# Patient Record
Sex: MALE | Race: WHITE | NOT HISPANIC OR LATINO | Employment: PART TIME | URBAN - METROPOLITAN AREA
[De-identification: names, ages, dates, MRNs, and addresses within clinical notes are randomized per-mention and may not be internally consistent; named-entity substitution may affect disease eponyms.]

---

## 2018-04-06 ENCOUNTER — OFFICE VISIT (OUTPATIENT)
Dept: INTERNAL MEDICINE | Age: 48
End: 2018-04-06

## 2018-04-06 ENCOUNTER — HOSPITAL ENCOUNTER (OUTPATIENT)
Dept: GENERAL RADIOLOGY | Facility: HOSPITAL | Age: 48
Discharge: HOME | End: 2018-04-06
Admitting: NURSE PRACTITIONER

## 2018-04-06 VITALS
SYSTOLIC BLOOD PRESSURE: 132 MMHG | HEART RATE: 89 BPM | HEIGHT: 69 IN | OXYGEN SATURATION: 98 % | DIASTOLIC BLOOD PRESSURE: 88 MMHG | WEIGHT: 233.8 LBS | BODY MASS INDEX: 34.63 KG/M2 | TEMPERATURE: 98.7 F

## 2018-04-06 DIAGNOSIS — R10.13 CHRONIC EPIGASTRIC PAIN: ICD-10-CM

## 2018-04-06 DIAGNOSIS — K22.70 BARRETT'S ESOPHAGUS WITHOUT DYSPLASIA: ICD-10-CM

## 2018-04-06 DIAGNOSIS — Z13.1 SCREENING FOR DIABETES MELLITUS: ICD-10-CM

## 2018-04-06 DIAGNOSIS — Z23 ENCOUNTER FOR IMMUNIZATION: ICD-10-CM

## 2018-04-06 DIAGNOSIS — G89.29 CHRONIC EPIGASTRIC PAIN: ICD-10-CM

## 2018-04-06 DIAGNOSIS — N52.9 ERECTILE DYSFUNCTION, UNSPECIFIED ERECTILE DYSFUNCTION TYPE: ICD-10-CM

## 2018-04-06 DIAGNOSIS — Z00.00 PREVENTATIVE HEALTH CARE: Primary | ICD-10-CM

## 2018-04-06 DIAGNOSIS — R05.8 COUGH PRESENT FOR GREATER THAN 3 WEEKS: ICD-10-CM

## 2018-04-06 DIAGNOSIS — Z13.220 SCREENING FOR LIPID DISORDERS: ICD-10-CM

## 2018-04-06 PROCEDURE — 71046 X-RAY EXAM CHEST 2 VIEWS: CPT

## 2018-04-06 PROCEDURE — 99203 OFFICE O/P NEW LOW 30 MIN: CPT | Performed by: NURSE PRACTITIONER

## 2018-04-06 PROCEDURE — 90471 IMMUNIZATION ADMIN: CPT | Performed by: NURSE PRACTITIONER

## 2018-04-06 PROCEDURE — 90715 TDAP VACCINE 7 YRS/> IM: CPT | Performed by: NURSE PRACTITIONER

## 2018-04-06 RX ORDER — SILDENAFIL 50 MG/1
50 TABLET, FILM COATED ORAL DAILY PRN
Qty: 10 TABLET | Refills: 1 | Status: SHIPPED | OUTPATIENT
Start: 2018-04-06 | End: 2019-11-11

## 2018-04-06 RX ORDER — RANITIDINE 150 MG/1
150 CAPSULE ORAL DAILY
Qty: 90 CAPSULE | Refills: 1 | Status: SHIPPED | OUTPATIENT
Start: 2018-04-06 | End: 2018-10-09 | Stop reason: SDUPTHER

## 2018-04-06 RX ORDER — LEVOCETIRIZINE DIHYDROCHLORIDE 5 MG/1
5 TABLET, FILM COATED ORAL DAILY
Refills: 2 | COMMUNITY
Start: 2018-03-08 | End: 2018-11-20 | Stop reason: SDUPTHER

## 2018-04-06 RX ORDER — DEXLANSOPRAZOLE 60 MG/1
60 CAPSULE, DELAYED RELEASE ORAL
COMMUNITY
End: 2018-05-29 | Stop reason: SDUPTHER

## 2018-04-06 RX ORDER — DEXLANSOPRAZOLE 60 MG/1
1 CAPSULE, DELAYED RELEASE ORAL DAILY
Refills: 0 | COMMUNITY
Start: 2018-02-19 | End: 2018-04-06 | Stop reason: SDUPTHER

## 2018-04-06 RX ORDER — MOMETASONE FUROATE 50 UG/1
SPRAY, METERED NASAL
Refills: 0 | COMMUNITY
Start: 2018-02-09 | End: 2018-05-14 | Stop reason: SDUPTHER

## 2018-04-06 RX ORDER — AMLODIPINE BESYLATE 10 MG/1
TABLET ORAL
COMMUNITY
Start: 2018-02-10 | End: 2018-11-29 | Stop reason: SDUPTHER

## 2018-04-06 NOTE — PROGRESS NOTES
"[facility] [place] [facility]      Ariel Portillo / 47 y.o. / male  04/06/2018    VITALS:    Visit Vitals  /88   Pulse 89   Temp 98.7 °F (37.1 °C)   Ht 175.3 cm (69\")   Wt 106 kg (233 lb 12.8 oz)   SpO2 98%   BMI 34.53 kg/m²       BP Readings from Last 3 Encounters:   04/06/18 132/88     Wt Readings from Last 3 Encounters:   04/06/18 106 kg (233 lb 12.8 oz)      Body mass index is 34.53 kg/m².    CC: Main reason(s) for today's visit: Establish Care; Cough (Cough x 6 mths ); and Erectile Dysfunction (Would like to discuss being put on medication )      HPI:    Patient is a 47 y.o. male who is here to establish care. Previous PCP was Devika Davila (sp?) GLENN. Last visit 1 month ago, has not had labs in over 1.5 years.     Cough: Patient complains of nonproductive cough.  Symptoms began 6 months ago.  The cough is non-productive, without wheezing, dyspnea or hemoptysis and is aggravated by reclining position Associated symptoms include:heartburn. Patient does not have new pets. Patient does not have a history of asthma. Patient does have a history of environmental allergens. Patient has not recent travel. Patient does not have a history of smoking. Patient  does not have previous Chest X-ray.   He has history of Espinal's esophagus, last endoscopy/colonoscopy was at least 5 years ago. He has been on dexilant since then, takes Zantac as needed. Still has persistent epigastric discomfort at times.   He was started on allergy medication recently in the past 1.5 months r/t cough but has not yet noted much improvement.     Reports issues with fatigue, some weight gain (although has not been regularly exercising), and issues with maintaining an erection as well as some decreased libido. He reports having had testosterone level checked in the past and was told it was \"mildly low\".     Patient Care Team:  GLENN Carlos as PCP - General (Internal " Medicine)  ____________________________________________________________________    ASSESSMENT & PLAN:    1. Preventative health care    - CBC & Differential  - Comprehensive Metabolic Panel  - Hemoglobin A1c  - Lipid Panel With / Chol / HDL Ratio  - TSH Rfx On Abnormal To Free T4    2. Espinal's esophagus without dysplasia    - ranitidine (ZANTAC) 150 MG capsule; Take 1 capsule by mouth Daily.  Dispense: 90 capsule; Refill: 1  - Ambulatory Referral to Gastroenterology    3. Cough present for greater than 3 weeks  Cough appears to be most likely related to GERD, but will obtain CXR today to r/o pulmonary abnormality due to chronicity of the cough.     - XR Chest PA & Lateral    4. Screening for diabetes mellitus    - Comprehensive Metabolic Panel  - Hemoglobin A1c    5. Screening for lipid disorders    - Lipid Panel With / Chol / HDL Ratio    6. Encounter for immunization    - Tdap Vaccine Greater Than or Equal To 8yo IM    7. Chronic epigastric pain    - ranitidine (ZANTAC) 150 MG capsule; Take 1 capsule by mouth Daily.  Dispense: 90 capsule; Refill: 1  - Ambulatory Referral to Gastroenterology    8. Erectile dysfunction, unspecified erectile dysfunction type  Will check labs today for thyroid dysfunction, diabetes screening. Discussed with patient we will also do testosterone testing, although he will need to return to office first thing AM to have this done, which he agrees to.       Return in about 2 months (around 6/6/2018) for Next scheduled follow up in office, schedule AM lab 1-2 weeks at convenience (between 8-9am) .    Future Appointments  Date Time Provider Department Center   6/8/2018 2:00 PM GLENN Carlos [facility] None       ____________________________________________________________________        REVIEW OF SYSTEMS    Review of Systems   Constitutional: Positive for fatigue. Negative for chills, fever and unexpected weight change.   Respiratory: Positive for cough (worse with cold weather)  and shortness of breath.    Cardiovascular: Negative for chest pain and leg swelling.   Gastrointestinal: Positive for abdominal pain (constant epigastric pressure). Negative for constipation, diarrhea, nausea and vomiting.   Endocrine: Negative for cold intolerance, heat intolerance, polydipsia, polyphagia and polyuria.   Genitourinary: Negative for difficulty urinating, discharge, dysuria, frequency, penile pain and testicular pain.   Musculoskeletal: Negative for arthralgias and myalgias.   Allergic/Immunologic: Positive for environmental allergies.         PHYSICAL EXAMINATION    Physical Exam   Constitutional: He is oriented to person, place, and time. Vital signs are normal. He appears well-developed and well-nourished. He is cooperative. He does not appear ill. No distress.   Cardiovascular: Normal rate, regular rhythm, S1 normal, S2 normal and normal heart sounds.    No murmur heard.  Pulmonary/Chest: Effort normal and breath sounds normal. He has no decreased breath sounds. He has no wheezes. He has no rhonchi. He has no rales.   Abdominal: Normal appearance and bowel sounds are normal. There is tenderness in the epigastric area.   Neurological: He is alert and oriented to person, place, and time.   Skin: Skin is warm, dry and intact.   Psychiatric: He has a normal mood and affect. His speech is normal and behavior is normal. Judgment and thought content normal. Cognition and memory are normal.   Nursing note and vitals reviewed.      REVIEWED DATA:    Labs:   No results found for: NA, K, AST, ALT, BUN, CREATININE, EGFRIFNONA, EGFRIFAFRI    No results found for: GLUCOSE, HGBA1C, MICROALBUR    No results found for: LDL, HDL, TRIG, CHOLHDLRATIO    No results found for: TSH, FREET4     No results found for: WBC, HGB, PLT      Imaging:        Medical Tests:        Summary of old records / correspondence / consultant report:        Request outside records:         ______________________________________________________________________    ALLERGIES  No Known Allergies     MEDICATIONS  Current Outpatient Prescriptions   Medication Sig Dispense Refill   • amLODIPine (NORVASC) 10 MG tablet      • dexlansoprazole (DEXILANT) 60 MG capsule Take 60 mg by mouth.     • levocetirizine (XYZAL) 5 MG tablet Take 5 mg by mouth Daily.  2   • mometasone (NASONEX) 50 MCG/ACT nasal spray USE1 SPRAY EACH NOSTRIL TWICE A DAY  0   • ranitidine (ZANTAC) 150 MG capsule Take 1 capsule by mouth Daily. 90 capsule 1   • sildenafil (VIAGRA) 50 MG tablet Take 1 tablet by mouth Daily As Needed for erectile dysfunction. 10 tablet 1     No current facility-administered medications for this visit.        PFSH:     The following portions of the patient's history were reviewed and updated as appropriate: Allergies / Current Medications / Past Medical History / Surgical History / Social History / Family History    PROBLEM LIST   There is no problem list on file for this patient.      PAST MEDICAL HISTORY  Past Medical History:   Diagnosis Date   • Allergic    • Espinal esophagus    • Hypertension        SURGICAL HISTORY  Past Surgical History:   Procedure Laterality Date   • COLONOSCOPY     • ENDOSCOPY AND COLONOSCOPY         SOCIAL HISTORY  Social History     Social History   • Marital status: Single     Social History Main Topics   • Smoking status: Never Smoker   • Alcohol use 1.2 oz/week     2 Cans of beer per week      Comment: Occasional    • Drug use: No   • Sexual activity: Yes     Partners: Female     Birth control/ protection: None     Other Topics Concern   • Not on file       FAMILY HISTORY  Family History   Problem Relation Age of Onset   • Heart disease Mother    • Heart disease Father    • Other Sister    • Thyroid disease Sister          **Dragon Disclaimer:   Much of this encounter note is an electronic transcription/translation of spoken language to printed text. The electronic  translation of spoken language may permit erroneous, or at times, nonsensical words or phrases to be inadvertently transcribed. Although I have reviewed the note for such errors, some may still exist.

## 2018-04-07 LAB
ALBUMIN SERPL-MCNC: 4.6 G/DL (ref 3.5–5.2)
ALBUMIN/GLOB SERPL: 1.6 G/DL
ALP SERPL-CCNC: 66 U/L (ref 39–117)
ALT SERPL-CCNC: 35 U/L (ref 1–41)
AST SERPL-CCNC: 25 U/L (ref 1–40)
BASOPHILS # BLD AUTO: 0.09 10*3/MM3 (ref 0–0.2)
BASOPHILS NFR BLD AUTO: 1.8 % (ref 0–1.5)
BILIRUB SERPL-MCNC: 1.2 MG/DL (ref 0.1–1.2)
BUN SERPL-MCNC: 15 MG/DL (ref 6–20)
BUN/CREAT SERPL: 12.4 (ref 7–25)
CALCIUM SERPL-MCNC: 9.8 MG/DL (ref 8.6–10.5)
CHLORIDE SERPL-SCNC: 99 MMOL/L (ref 98–107)
CHOLEST SERPL-MCNC: 227 MG/DL (ref 0–200)
CHOLEST/HDLC SERPL: 3.98 {RATIO}
CO2 SERPL-SCNC: 29.5 MMOL/L (ref 22–29)
CREAT SERPL-MCNC: 1.21 MG/DL (ref 0.76–1.27)
EOSINOPHIL # BLD AUTO: 0.24 10*3/MM3 (ref 0–0.7)
EOSINOPHIL NFR BLD AUTO: 4.7 % (ref 0.3–6.2)
ERYTHROCYTE [DISTWIDTH] IN BLOOD BY AUTOMATED COUNT: 14 % (ref 11.5–14.5)
GFR SERPLBLD CREATININE-BSD FMLA CKD-EPI: 64 ML/MIN/1.73
GFR SERPLBLD CREATININE-BSD FMLA CKD-EPI: 77 ML/MIN/1.73
GLOBULIN SER CALC-MCNC: 2.8 GM/DL
GLUCOSE SERPL-MCNC: 82 MG/DL (ref 65–99)
HBA1C MFR BLD: 5.03 % (ref 4.8–5.6)
HCT VFR BLD AUTO: 46.1 % (ref 40.4–52.2)
HDLC SERPL-MCNC: 57 MG/DL (ref 40–60)
HGB BLD-MCNC: 16 G/DL (ref 13.7–17.6)
IMM GRANULOCYTES # BLD: 0 10*3/MM3 (ref 0–0.03)
IMM GRANULOCYTES NFR BLD: 0 % (ref 0–0.5)
LDLC SERPL CALC-MCNC: 151 MG/DL (ref 0–100)
LYMPHOCYTES # BLD AUTO: 1.31 10*3/MM3 (ref 0.9–4.8)
LYMPHOCYTES NFR BLD AUTO: 25.5 % (ref 19.6–45.3)
MCH RBC QN AUTO: 30.1 PG (ref 27–32.7)
MCHC RBC AUTO-ENTMCNC: 34.7 G/DL (ref 32.6–36.4)
MCV RBC AUTO: 86.8 FL (ref 79.8–96.2)
MONOCYTES # BLD AUTO: 0.35 10*3/MM3 (ref 0.2–1.2)
MONOCYTES NFR BLD AUTO: 6.8 % (ref 5–12)
NEUTROPHILS # BLD AUTO: 3.14 10*3/MM3 (ref 1.9–8.1)
NEUTROPHILS NFR BLD AUTO: 61.2 % (ref 42.7–76)
PLATELET # BLD AUTO: 248 10*3/MM3 (ref 140–500)
POTASSIUM SERPL-SCNC: 4.2 MMOL/L (ref 3.5–5.2)
PROT SERPL-MCNC: 7.4 G/DL (ref 6–8.5)
RBC # BLD AUTO: 5.31 10*6/MM3 (ref 4.6–6)
SODIUM SERPL-SCNC: 141 MMOL/L (ref 136–145)
TRIGL SERPL-MCNC: 93 MG/DL (ref 0–150)
TSH SERPL DL<=0.005 MIU/L-ACNC: 2.63 MIU/ML (ref 0.27–4.2)
VLDLC SERPL CALC-MCNC: 18.6 MG/DL (ref 5–40)
WBC # BLD AUTO: 5.13 10*3/MM3 (ref 4.5–10.7)

## 2018-04-09 ENCOUNTER — TELEPHONE (OUTPATIENT)
Dept: INTERNAL MEDICINE | Age: 48
End: 2018-04-09

## 2018-04-09 RX ORDER — SILDENAFIL CITRATE 20 MG/1
40 TABLET ORAL DAILY PRN
Qty: 20 TABLET | Refills: 0 | Status: SHIPPED | OUTPATIENT
Start: 2018-04-09 | End: 2019-11-11 | Stop reason: SDUPTHER

## 2018-04-09 NOTE — TELEPHONE ENCOUNTER
Pt states Rx for Viagra 50mg Tablet is too expensive.    Qty #10 costs $390.00 at pharmacy.    Pt is requesting new Rx for either Cialis or Levitra.    Please advise.    KD

## 2018-05-09 ENCOUNTER — TELEPHONE (OUTPATIENT)
Dept: GASTROENTEROLOGY | Facility: CLINIC | Age: 48
End: 2018-05-09

## 2018-05-14 DIAGNOSIS — N52.9 ERECTILE DYSFUNCTION, UNSPECIFIED ERECTILE DYSFUNCTION TYPE: ICD-10-CM

## 2018-05-14 DIAGNOSIS — R68.82 DECREASED LIBIDO: Primary | ICD-10-CM

## 2018-05-14 RX ORDER — MOMETASONE FUROATE 50 UG/1
SPRAY, METERED NASAL
Qty: 1 EACH | Refills: 5 | Status: SHIPPED | OUTPATIENT
Start: 2018-05-14 | End: 2018-11-20 | Stop reason: SDUPTHER

## 2018-05-16 LAB
TESTOST FREE SERPL-MCNC: 6.5 PG/ML (ref 6.8–21.5)
TESTOST SERPL-MCNC: 342 NG/DL (ref 264–916)

## 2018-05-29 RX ORDER — DEXLANSOPRAZOLE 60 MG/1
60 CAPSULE, DELAYED RELEASE ORAL DAILY
Qty: 90 CAPSULE | Refills: 1 | Status: SHIPPED | OUTPATIENT
Start: 2018-05-29 | End: 2018-11-20 | Stop reason: SDUPTHER

## 2018-06-08 ENCOUNTER — OFFICE VISIT (OUTPATIENT)
Dept: INTERNAL MEDICINE | Age: 48
End: 2018-06-08

## 2018-06-08 VITALS
DIASTOLIC BLOOD PRESSURE: 86 MMHG | HEART RATE: 83 BPM | OXYGEN SATURATION: 98 % | WEIGHT: 232.6 LBS | HEIGHT: 69 IN | TEMPERATURE: 98.8 F | BODY MASS INDEX: 34.45 KG/M2 | SYSTOLIC BLOOD PRESSURE: 138 MMHG

## 2018-06-08 DIAGNOSIS — R05.3 CHRONIC COUGH: ICD-10-CM

## 2018-06-08 DIAGNOSIS — G89.29 CHRONIC RIGHT EAR PAIN: Primary | ICD-10-CM

## 2018-06-08 DIAGNOSIS — H92.01 CHRONIC RIGHT EAR PAIN: Primary | ICD-10-CM

## 2018-06-08 DIAGNOSIS — H65.191 ACUTE EFFUSION OF RIGHT EAR: ICD-10-CM

## 2018-06-08 PROCEDURE — 99214 OFFICE O/P EST MOD 30 MIN: CPT | Performed by: NURSE PRACTITIONER

## 2018-06-08 NOTE — PROGRESS NOTES
Subjective   Ariel Portillo is a 47 y.o. male.     Cough   This is a chronic problem. Episode onset: > 6 months ago. The problem has been waxing and waning. The cough is non-productive. Associated symptoms include ear pain and shortness of breath. Pertinent negatives include no chest pain, chills, fever, sweats, weight loss or wheezing. Nothing aggravates the symptoms. Treatments tried: Currently on Xyzal daily and Nasonex nasal spray without improvement of symptoms  His past medical history is significant for environmental allergies. There is no history of asthma or COPD.   Earache    There is pain in the right ear. This is a chronic problem. The current episode started more than 1 year ago. The problem has been waxing and waning. There has been no fever. Associated symptoms include coughing. Pertinent negatives include no vomiting. He has tried nothing for the symptoms. There is no history of a chronic ear infection, hearing loss or a tympanostomy tube.      Patient also currently on Dexilant and Zantac daily for treatment of GERD and Espinal's Esophagus. He was referred to GI at last visit for evaluation and likely endoscopy, but has not yet scheduled appointment.     The following portions of the patient's history were reviewed and updated as appropriate: allergies, current medications, past family history, past medical history, past social history, past surgical history and problem list.    Review of Systems   Constitutional: Negative for chills, fever and unexpected weight loss.   HENT: Positive for ear pain.    Respiratory: Positive for cough and shortness of breath. Negative for wheezing.    Cardiovascular: Negative for chest pain.   Gastrointestinal: Positive for GERD (currently controlled). Negative for nausea and vomiting.   Allergic/Immunologic: Positive for environmental allergies.       Objective   Physical Exam   Constitutional: He is oriented to person, place, and time. Vital signs are normal. He  appears well-developed and well-nourished. He is active. He does not appear ill. No distress.   HENT:   Head: Normocephalic and atraumatic.   Right Ear: Hearing, external ear and ear canal normal. Tympanic membrane is retracted. A middle ear effusion (small) is present.   Left Ear: Hearing, tympanic membrane, external ear and ear canal normal.   Nose: Nose normal. Right sinus exhibits no maxillary sinus tenderness and no frontal sinus tenderness. Left sinus exhibits no maxillary sinus tenderness and no frontal sinus tenderness.   Mouth/Throat: Uvula is midline, oropharynx is clear and moist and mucous membranes are normal. No tonsillar exudate.   Cardiovascular: Normal rate, regular rhythm and normal heart sounds.    No murmur heard.  Pulmonary/Chest: Effort normal and breath sounds normal.   Lymphadenopathy:        Head (right side): No submental, no submandibular, no tonsillar, no preauricular, no posterior auricular and no occipital adenopathy present.        Head (left side): No submental, no submandibular, no tonsillar, no preauricular, no posterior auricular and no occipital adenopathy present.     He has no cervical adenopathy.   Neurological: He is alert and oriented to person, place, and time.   Psychiatric: He has a normal mood and affect. His speech is normal and behavior is normal. Thought content normal.   Nursing note and vitals reviewed.        Assessment/Plan   Problems Addressed this Visit     None      Visit Diagnoses     Chronic right ear pain    -  Primary    Relevant Orders    Ambulatory Referral to ENT (Otolaryngology)    Acute effusion of right ear        Relevant Orders    Ambulatory Referral to ENT (Otolaryngology)    Chronic cough        Relevant Orders    Full Pulmonary Function Test With Bronchodilator        1. Chronic right ear pain  Appears to have mild effusion of right ear. Suspect chronic eustachian tube dysfunction, patient is already on NCS and daily antihistamine. Discussed will  refer to ENT for further evaluation and treatmet of likely eustachian tube dysfunction.     - Ambulatory Referral to ENT (Otolaryngology)    2. Acute effusion of right ear    - Ambulatory Referral to ENT (Otolaryngology)    3. Chronic cough  Patient has had intermittent ongoing cough for > 8 months at this point. Not worsened by activity, not worse at nighttime. Recent CXR WNL. Suspect may be either related to allergies or possible GERD, but will obtain PFT testing to r/o any obstructive pulmonary process. Consider adding Singulair for further treatment of allergies pending results of PFTs.   - Full Pulmonary Function Test With Bronchodilator

## 2018-06-08 NOTE — PATIENT INSTRUCTIONS
Eustachian Tube Dysfunction     The eustachian tube connects the middle ear to the back of the nose. It regulates air pressure in the middle ear by allowing air to move between the ear and nose. It also helps to drain fluid from the middle ear space. When the eustachian tube does not function properly, air pressure, fluid, or both can build up in the middle ear.  Eustachian tube dysfunction can affect one or both ears.  What are the causes?  This condition happens when the eustachian tube becomes blocked or cannot open normally. This may result from:  · Ear infections.  · Colds and other upper respiratory infections.  · Allergies.  · Irritation, such as from cigarette smoke or acid from the stomach coming up into the esophagus (gastroesophageal reflux).  · Sudden changes in air pressure, such as from descending in an airplane.  · Abnormal growths in the nose or throat, such as nasal polyps, tumors, or enlarged tissue at the back of the throat (adenoids).  What increases the risk?  This condition may be more likely to develop in people who smoke and people who are overweight. Eustachian tube dysfunction may also be more likely to develop in children, especially children who have:  · Certain birth defects of the mouth, such as cleft palate.  · Large tonsils and adenoids.  What are the signs or symptoms?  Symptoms of this condition may include:  · A feeling of fullness in the ear.  · Ear pain.  · Clicking or popping noises in the ear.  · Ringing in the ear.  · Hearing loss.  · Loss of balance.  Symptoms may get worse when the air pressure around you changes, such as when you travel to an area of high elevation or fly on an airplane.  How is this diagnosed?  This condition may be diagnosed based on:  · Your symptoms.  · A physical exam of your ear, nose, and throat.  · Tests, such as those that measure:  ¨ The movement of your eardrum (tympanogram).  ¨ Your hearing (audiometry).  How is this treated?  Treatment depends  "on the cause and severity of your condition. If your symptoms are mild, you may be able to relieve your symptoms by moving air into (\"popping\") your ears. If you have symptoms of fluid in your ears, treatment may include:  · Decongestants.  · Antihistamines.  · Nasal sprays or ear drops that contain medicines that reduce swelling (steroids).  In some cases, you may need to have a procedure to drain the fluid in your eardrum (myringotomy). In this procedure, a small tube is placed in the eardrum to:  · Drain the fluid.  · Restore the air in the middle ear space.  Follow these instructions at home:  · Take over-the-counter and prescription medicines only as told by your health care provider.  · Use techniques to help pop your ears as recommended by your health care provider. These may include:  ¨ Chewing gum.  ¨ Yawning.  ¨ Frequent, forceful swallowing.  ¨ Closing your mouth, holding your nose closed, and gently blowing as if you are trying to blow air out of your nose.  · Do not do any of the following until your health care provider approves:  ¨ Travel to high altitudes.  ¨ Fly in airplanes.  ¨ Work in a pressurized cabin or room.  ¨ Scuba dive.  · Keep your ears dry. Dry your ears completely after showering or bathing.  · Do not smoke.  · Keep all follow-up visits as told by your health care provider. This is important.  Contact a health care provider if:  · Your symptoms do not go away after treatment.  · Your symptoms come back after treatment.  · You are unable to pop your ears.  · You have:  ¨ A fever.  ¨ Pain in your ear.  ¨ Pain in your head or neck.  ¨ Fluid draining from your ear.  · Your hearing suddenly changes.  · You become very dizzy.  · You lose your balance.  This information is not intended to replace advice given to you by your health care provider. Make sure you discuss any questions you have with your health care provider.  Document Released: 01/13/2017 Document Revised: 05/25/2017 Document " Reviewed: 01/06/2016  [employer] Interactive Patient Education © 2017 [employer]

## 2018-06-15 ENCOUNTER — APPOINTMENT (OUTPATIENT)
Dept: RESPIRATORY THERAPY | Facility: HOSPITAL | Age: 48
End: 2018-06-15

## 2018-06-18 ENCOUNTER — HOSPITAL ENCOUNTER (OUTPATIENT)
Dept: RESPIRATORY THERAPY | Facility: HOSPITAL | Age: 48
Discharge: HOME | End: 2018-06-18
Admitting: NURSE PRACTITIONER

## 2018-06-18 PROCEDURE — 94726 PLETHYSMOGRAPHY LUNG VOLUMES: CPT

## 2018-06-18 PROCEDURE — 94729 DIFFUSING CAPACITY: CPT

## 2018-06-18 PROCEDURE — 94010 BREATHING CAPACITY TEST: CPT

## 2018-06-18 RX ORDER — ALBUTEROL SULFATE 2.5 MG/3ML
2.5 SOLUTION RESPIRATORY (INHALATION) ONCE
Status: DISCONTINUED | OUTPATIENT
Start: 2018-06-18 | End: 2018-06-19 | Stop reason: HOSPADM

## 2018-10-09 DIAGNOSIS — K22.70 BARRETT'S ESOPHAGUS WITHOUT DYSPLASIA: ICD-10-CM

## 2018-10-09 DIAGNOSIS — G89.29 CHRONIC EPIGASTRIC PAIN: ICD-10-CM

## 2018-10-09 DIAGNOSIS — R10.13 CHRONIC EPIGASTRIC PAIN: ICD-10-CM

## 2018-10-09 RX ORDER — RANITIDINE 150 MG/1
150 CAPSULE ORAL DAILY
Qty: 90 CAPSULE | Refills: 1 | Status: SHIPPED | OUTPATIENT
Start: 2018-10-09 | End: 2019-04-19

## 2018-11-20 RX ORDER — LEVOCETIRIZINE DIHYDROCHLORIDE 5 MG/1
5 TABLET, FILM COATED ORAL DAILY
Qty: 90 TABLET | Refills: 2 | Status: SHIPPED | OUTPATIENT
Start: 2018-11-20 | End: 2020-01-29

## 2018-11-20 RX ORDER — MOMETASONE FUROATE 50 UG/1
SPRAY, METERED NASAL
Qty: 1 EACH | Refills: 5 | Status: SHIPPED | OUTPATIENT
Start: 2018-11-20 | End: 2020-07-13 | Stop reason: SDUPTHER

## 2018-11-20 RX ORDER — DEXLANSOPRAZOLE 60 MG/1
60 CAPSULE, DELAYED RELEASE ORAL DAILY
Qty: 90 CAPSULE | Refills: 2 | Status: SHIPPED | OUTPATIENT
Start: 2018-11-20 | End: 2019-04-19 | Stop reason: SDUPTHER

## 2018-11-29 RX ORDER — AMLODIPINE BESYLATE 10 MG/1
10 TABLET ORAL DAILY
Qty: 90 TABLET | Refills: 1 | Status: SHIPPED | OUTPATIENT
Start: 2018-11-29 | End: 2019-02-06

## 2019-01-07 ENCOUNTER — OFFICE VISIT (OUTPATIENT)
Dept: INTERNAL MEDICINE | Age: 49
End: 2019-01-07

## 2019-01-07 VITALS
WEIGHT: 224 LBS | HEART RATE: 95 BPM | OXYGEN SATURATION: 98 % | HEIGHT: 69 IN | BODY MASS INDEX: 33.18 KG/M2 | SYSTOLIC BLOOD PRESSURE: 128 MMHG | DIASTOLIC BLOOD PRESSURE: 88 MMHG | TEMPERATURE: 98.1 F

## 2019-01-07 DIAGNOSIS — K22.70 BARRETT'S ESOPHAGUS WITHOUT DYSPLASIA: ICD-10-CM

## 2019-01-07 DIAGNOSIS — H65.491 CHRONIC OTITIS MEDIA OF RIGHT EAR WITH EFFUSION: ICD-10-CM

## 2019-01-07 DIAGNOSIS — Z23 ENCOUNTER FOR IMMUNIZATION: ICD-10-CM

## 2019-01-07 DIAGNOSIS — R05.8 ALLERGIC COUGH: Primary | ICD-10-CM

## 2019-01-07 DIAGNOSIS — I10 ESSENTIAL HYPERTENSION: ICD-10-CM

## 2019-01-07 PROCEDURE — 90471 IMMUNIZATION ADMIN: CPT | Performed by: NURSE PRACTITIONER

## 2019-01-07 PROCEDURE — 99214 OFFICE O/P EST MOD 30 MIN: CPT | Performed by: NURSE PRACTITIONER

## 2019-01-07 PROCEDURE — 90674 CCIIV4 VAC NO PRSV 0.5 ML IM: CPT | Performed by: NURSE PRACTITIONER

## 2019-01-07 RX ORDER — MONTELUKAST SODIUM 10 MG/1
10 TABLET ORAL NIGHTLY
Qty: 30 TABLET | Refills: 5 | Status: SHIPPED | OUTPATIENT
Start: 2019-01-07 | End: 2019-04-19 | Stop reason: SDUPTHER

## 2019-01-07 NOTE — PROGRESS NOTES
Subjective   Ariel Portillo is a 48 y.o. male.     Hypertension   This is a chronic problem. The problem is unchanged. The problem is controlled. Pertinent negatives include no anxiety, blurred vision, chest pain, headaches, malaise/fatigue, neck pain, orthopnea, palpitations, peripheral edema, PND, shortness of breath or sweats. Risk factors for coronary artery disease include male gender, obesity and family history. Past treatments include calcium channel blockers. Current antihypertension treatment includes nothing. Compliance problems: Patient reports ran out of medication about 1 month ago, has not taken since. BP have been 130/80s at home.     Cough   This is a chronic problem. The current episode started more than 1 year ago. The problem has been unchanged. The problem occurs constantly. The cough is non-productive. Pertinent negatives include no chest pain, headaches, shortness of breath or sweats. Nothing aggravates the symptoms. He has tried OTC cough suppressant (Xyzal, NCS. ) for the symptoms. The treatment provided mild relief. His past medical history is significant for environmental allergies.   He had PFTs done after last visit which were WNL.     History of Espinal's esophagus, last endoscopy approximately > 8 years ago. He was referred to GI last April but has not yet followed up.      The following portions of the patient's history were reviewed and updated as appropriate: allergies, current medications, past family history, past medical history, past social history, past surgical history and problem list.    Review of Systems   Constitutional: Negative for malaise/fatigue.   Eyes: Negative for blurred vision.   Respiratory: Positive for cough. Negative for shortness of breath.    Cardiovascular: Negative for chest pain, palpitations, orthopnea and PND.   Musculoskeletal: Negative for neck pain.   Allergic/Immunologic: Positive for environmental allergies.       Objective   Physical Exam    Constitutional: He is oriented to person, place, and time. Vital signs are normal. He appears well-developed and well-nourished. He is active. He does not appear ill. No distress.   HENT:   Head: Normocephalic and atraumatic.   Right Ear: Hearing, external ear and ear canal normal. A middle ear effusion is present.   Left Ear: Hearing, external ear and ear canal normal. A middle ear effusion is present.   Nose: Nose normal. Right sinus exhibits no maxillary sinus tenderness and no frontal sinus tenderness. Left sinus exhibits no maxillary sinus tenderness and no frontal sinus tenderness.   Mouth/Throat: Uvula is midline, oropharynx is clear and moist and mucous membranes are normal. No tonsillar exudate.   Cardiovascular: Normal rate, regular rhythm and normal heart sounds.   No murmur heard.  Pulmonary/Chest: Effort normal and breath sounds normal.   Lymphadenopathy:        Head (right side): No submental, no submandibular, no tonsillar, no preauricular, no posterior auricular and no occipital adenopathy present.        Head (left side): No submental, no submandibular, no tonsillar, no preauricular, no posterior auricular and no occipital adenopathy present.     He has no cervical adenopathy.   Neurological: He is alert and oriented to person, place, and time.   Psychiatric: He has a normal mood and affect. His speech is normal and behavior is normal. Thought content normal.   Nursing note and vitals reviewed.        Assessment/Plan   Problems Addressed this Visit     None      Visit Diagnoses     Allergic cough    -  Primary    Relevant Medications    montelukast (SINGULAIR) 10 MG tablet    Espinal's esophagus without dysplasia        Essential hypertension        Encounter for immunization        Relevant Orders    Flucelvax Quad=>4Years (PFS)    Chronic otitis media of right ear with effusion            1. Allergic cough  Persistent dry cough despite use of nasal corticosteroid spray and antihistamine.   Patient has been on Singulair in the past, thinks it may have caused too much sedation for his work schedule.  He agrees to trial restart of Singulair, may reduce dose to 5 mg if causing too much somnolence to help with cough.    - montelukast (SINGULAIR) 10 MG tablet; Take 1 tablet by mouth Every Night.  Dispense: 30 tablet; Refill: 5    2. Espinal's esophagus without dysplasia  Discussed need to follow-up with Dr. Vásquez as previously referred, needs updated endoscopy.     3. Essential hypertension  Off of medication x 1 month, currently controlled. Continue to monitor at home, notify me if BP consistently > 140/90.     4. Encounter for immunization    - Flucelvax Quad=>4Years (PFS)    5. Chronic otitis media of right ear with effusion  ENT referral previously ordered, recommend following up as previously discussed.

## 2019-04-19 DIAGNOSIS — R05.8 ALLERGIC COUGH: ICD-10-CM

## 2019-04-19 RX ORDER — DEXLANSOPRAZOLE 60 MG/1
60 CAPSULE, DELAYED RELEASE ORAL DAILY
Qty: 90 CAPSULE | Refills: 2 | Status: SHIPPED | OUTPATIENT
Start: 2019-04-19 | End: 2020-05-19

## 2019-04-19 RX ORDER — MONTELUKAST SODIUM 10 MG/1
10 TABLET ORAL NIGHTLY
Qty: 90 TABLET | Refills: 2 | Status: SHIPPED | OUTPATIENT
Start: 2019-04-19 | End: 2020-05-04

## 2019-07-08 ENCOUNTER — OFFICE VISIT (OUTPATIENT)
Dept: INTERNAL MEDICINE | Age: 49
End: 2019-07-08

## 2019-07-08 VITALS
TEMPERATURE: 97.7 F | WEIGHT: 222.2 LBS | BODY MASS INDEX: 32.91 KG/M2 | OXYGEN SATURATION: 98 % | SYSTOLIC BLOOD PRESSURE: 138 MMHG | DIASTOLIC BLOOD PRESSURE: 98 MMHG | HEART RATE: 75 BPM | HEIGHT: 69 IN

## 2019-07-08 DIAGNOSIS — M54.2 CERVICAL PAIN (NECK): ICD-10-CM

## 2019-07-08 DIAGNOSIS — K22.70 BARRETT'S ESOPHAGUS WITHOUT DYSPLASIA: ICD-10-CM

## 2019-07-08 DIAGNOSIS — Z12.5 ENCOUNTER FOR PROSTATE CANCER SCREENING: ICD-10-CM

## 2019-07-08 DIAGNOSIS — Z00.00 PREVENTATIVE HEALTH CARE: ICD-10-CM

## 2019-07-08 DIAGNOSIS — I10 ESSENTIAL HYPERTENSION: Primary | ICD-10-CM

## 2019-07-08 LAB
ALBUMIN SERPL-MCNC: 4.4 G/DL (ref 3.5–5.2)
ALBUMIN/GLOB SERPL: 2.1 G/DL
ALP SERPL-CCNC: 54 U/L (ref 39–117)
ALT SERPL-CCNC: 24 U/L (ref 1–41)
AST SERPL-CCNC: 21 U/L (ref 1–40)
BASOPHILS # BLD AUTO: 0.09 10*3/MM3 (ref 0–0.2)
BASOPHILS NFR BLD AUTO: 1.9 % (ref 0–1.5)
BILIRUB SERPL-MCNC: 0.5 MG/DL (ref 0.2–1.2)
BUN SERPL-MCNC: 16 MG/DL (ref 6–20)
BUN/CREAT SERPL: 15.4 (ref 7–25)
CALCIUM SERPL-MCNC: 8.8 MG/DL (ref 8.6–10.5)
CHLORIDE SERPL-SCNC: 104 MMOL/L (ref 98–107)
CHOLEST SERPL-MCNC: 178 MG/DL (ref 0–200)
CHOLEST/HDLC SERPL: 3.3 {RATIO}
CO2 SERPL-SCNC: 28.3 MMOL/L (ref 22–29)
CREAT SERPL-MCNC: 1.04 MG/DL (ref 0.76–1.27)
EOSINOPHIL # BLD AUTO: 0.23 10*3/MM3 (ref 0–0.4)
EOSINOPHIL NFR BLD AUTO: 4.9 % (ref 0.3–6.2)
ERYTHROCYTE [DISTWIDTH] IN BLOOD BY AUTOMATED COUNT: 13.8 % (ref 12.3–15.4)
GLOBULIN SER CALC-MCNC: 2.1 GM/DL
GLUCOSE SERPL-MCNC: 100 MG/DL (ref 65–99)
HCT VFR BLD AUTO: 48.5 % (ref 37.5–51)
HDLC SERPL-MCNC: 54 MG/DL (ref 40–60)
HGB BLD-MCNC: 16.3 G/DL (ref 13–17.7)
IMM GRANULOCYTES # BLD AUTO: 0 10*3/MM3 (ref 0–0.05)
IMM GRANULOCYTES NFR BLD AUTO: 0 % (ref 0–0.5)
LDLC SERPL CALC-MCNC: 113 MG/DL (ref 0–100)
LYMPHOCYTES # BLD AUTO: 0.93 10*3/MM3 (ref 0.7–3.1)
LYMPHOCYTES NFR BLD AUTO: 19.7 % (ref 19.6–45.3)
MCH RBC QN AUTO: 29.6 PG (ref 26.6–33)
MCHC RBC AUTO-ENTMCNC: 33.6 G/DL (ref 31.5–35.7)
MCV RBC AUTO: 88 FL (ref 79–97)
MONOCYTES # BLD AUTO: 0.3 10*3/MM3 (ref 0.1–0.9)
MONOCYTES NFR BLD AUTO: 6.4 % (ref 5–12)
NEUTROPHILS # BLD AUTO: 3.16 10*3/MM3 (ref 1.7–7)
NEUTROPHILS NFR BLD AUTO: 67.1 % (ref 42.7–76)
NRBC BLD AUTO-RTO: 0 /100 WBC (ref 0–0.2)
PLATELET # BLD AUTO: 205 10*3/MM3 (ref 140–450)
POTASSIUM SERPL-SCNC: 4.6 MMOL/L (ref 3.5–5.2)
PROT SERPL-MCNC: 6.5 G/DL (ref 6–8.5)
PSA SERPL-MCNC: 0.69 NG/ML (ref 0–4)
RBC # BLD AUTO: 5.51 10*6/MM3 (ref 4.14–5.8)
SODIUM SERPL-SCNC: 143 MMOL/L (ref 136–145)
TRIGL SERPL-MCNC: 53 MG/DL (ref 0–150)
TSH SERPL DL<=0.005 MIU/L-ACNC: 3.22 MIU/ML (ref 0.27–4.2)
VLDLC SERPL CALC-MCNC: 10.6 MG/DL
WBC # BLD AUTO: 4.71 10*3/MM3 (ref 3.4–10.8)

## 2019-07-08 PROCEDURE — 99214 OFFICE O/P EST MOD 30 MIN: CPT | Performed by: NURSE PRACTITIONER

## 2019-07-08 RX ORDER — AMLODIPINE BESYLATE 5 MG/1
5 TABLET ORAL DAILY
Qty: 30 TABLET | Refills: 5 | Status: SHIPPED | OUTPATIENT
Start: 2019-07-08 | End: 2019-11-02 | Stop reason: SDUPTHER

## 2019-07-08 NOTE — PROGRESS NOTES
[facility] [place] [facility]  Page Portillo / 48 y.o. / male  07/08/2019      ASSESSMENT & PLAN:    Problem List Items Addressed This Visit        Cardiovascular and Mediastinum    Essential hypertension - Primary    Relevant Medications    amLODIPine (NORVASC) 5 MG tablet    Other Relevant Orders    Comprehensive Metabolic Panel       Digestive    Espinal's esophagus without dysplasia    Relevant Medications    dexlansoprazole (DEXILANT) 60 MG capsule       Nervous and Auditory    Cervical pain (neck)      Other Visit Diagnoses     Preventative health care        Relevant Orders    Comprehensive Metabolic Panel    CBC & Differential    Lipid Panel With / Chol / HDL Ratio    PSA Screen    TSH Rfx On Abnormal To Free T4    Encounter for prostate cancer screening        Relevant Orders    PSA Screen        Orders Placed This Encounter   Procedures   • Comprehensive Metabolic Panel   • Lipid Panel With / Chol / HDL Ratio   • PSA Screen   • TSH Rfx On Abnormal To Free T4   • CBC & Differential     New Medications Ordered This Visit   Medications   • amLODIPine (NORVASC) 5 MG tablet     Sig: Take 1 tablet by mouth Daily.     Dispense:  30 tablet     Refill:  5       Summary/Discussion:    1. Essential hypertension  Blood pressure suboptimally controlled.  Recommend restart amlodipine at 5 mg daily.  Patient will continue to monitor blood pressure at home, notify me via telephone or my chart in 2 weeks of blood pressure readings.  Check CMP today.  Follow-up in office in 4 months for blood pressure recheck, sooner as needed.    - Comprehensive Metabolic Panel  - amLODIPine (NORVASC) 5 MG tablet; Take 1 tablet by mouth Daily.  Dispense: 30 tablet; Refill: 5    2. Preventative health care    - Comprehensive Metabolic Panel  - CBC & Differential  - Lipid Panel With / Chol / HDL Ratio  - PSA Screen  - TSH Rfx On Abnormal To Free T4    3. Espinal's esophagus without dysplasia  Discussed once again with  "patient need to follow-up with gastroenterology for repeat EGD and colonoscopy as it is been approximately 8 years since most recent.  Currently on Dexilant, continue same.    4. Encounter for prostate cancer screening    - PSA Screen    5. Cervical pain (neck)  Patient would like to return to chiropractor at this time for therapy.  Recommended if pain does not improve, can follow-up with me we can discuss referral to orthopedics if he would like.  We will attempt to obtain report of previous x-rays at that time as needed.        Return in about 4 months (around 11/8/2019) for Next scheduled follow up.    ____________________________________________________________________    MEDICATIONS  Current Outpatient Medications   Medication Sig Dispense Refill   • dexlansoprazole (DEXILANT) 60 MG capsule Take 1 capsule by mouth Daily. 90 capsule 2   • mometasone (NASONEX) 50 MCG/ACT nasal spray ONE SPRAY AND EACH NOSTRIL TWICE DAILY 1 each 5   • montelukast (SINGULAIR) 10 MG tablet Take 1 tablet by mouth Every Night. 90 tablet 2   • amLODIPine (NORVASC) 5 MG tablet Take 1 tablet by mouth Daily. 30 tablet 5   • levocetirizine (XYZAL) 5 MG tablet Take 1 tablet by mouth Daily. 90 tablet 2   • sildenafil (REVATIO) 20 MG tablet Take 2 tablets by mouth Daily As Needed (erectile dysfunction). 20 tablet 0   • sildenafil (VIAGRA) 50 MG tablet Take 1 tablet by mouth Daily As Needed for erectile dysfunction. 10 tablet 1     No current facility-administered medications for this visit.           VITALS:    Visit Vitals  /98   Pulse 75   Temp 97.7 °F (36.5 °C)   Ht 175.3 cm (69\")   Wt 101 kg (222 lb 3.2 oz)   SpO2 98%   BMI 32.81 kg/m²       BP Readings from Last 3 Encounters:   07/08/19 138/98   01/07/19 128/88   06/08/18 138/86     Wt Readings from Last 3 Encounters:   07/08/19 101 kg (222 lb 3.2 oz)   01/07/19 102 kg (224 lb)   06/08/18 106 kg (232 lb 9.6 oz)      Body mass index is 32.81 kg/m².    CC:  Main reason(s) for today's " "visit: Hypertension (6 mo f/u)      HPI:     Hypertension: Patient here for follow-up of elevated blood pressure. He is exercising and is adherent to low salt diet.  Blood pressure is not well controlled at home. Cardiac symptoms none. Patient denies chest pain, chest pressure/discomfort, dyspnea, fatigue and lower extremity edema.  Cardiovascular risk factors: hypertension. Use of agents associated with hypertension: none. History of target organ damage: none. Patient was previously on amlodipine 10mg, had stopped it about 6-7 months ago because BP was running normal.     Neck Pain: Ethantent complains of neck pain. Event that precipitate these symptoms: none known. Onset of symptoms a few years ago, gradually worsening since that time. Current symptoms are pain in posterior neck, left shoulder, occasional radiation down left arm. Patient denies weakness in left arm. . Patient has had recurrent self limited episodes of neck pain in the past.  Previous treatments include: chiropractor/manipulation, reports had XR approximately 1.5 years ago which showed some \"neck issues\".      Patient Care Team:  Page Buitrago APRN as PCP - General (Internal Medicine)    ____________________________________________________________________    REVIEW OF SYSTEMS    Review of Systems   Constitutional: Negative for activity change, appetite change and unexpected weight change.   HENT: Negative for tinnitus.    Eyes: Negative for visual disturbance.   Respiratory: Negative for cough, chest tightness and shortness of breath.    Cardiovascular: Negative for chest pain, palpitations and leg swelling.   Musculoskeletal: Positive for neck pain and neck stiffness.   Neurological: Negative for dizziness, light-headedness and headaches.         PHYSICAL EXAMINATION    Physical Exam   Constitutional: He is oriented to person, place, and time. Vital signs are normal. He appears well-developed and well-nourished. He is cooperative. He does not " appear ill. No distress.   Cardiovascular: Normal rate, regular rhythm, S1 normal, S2 normal and normal heart sounds.   No murmur heard.  Pulmonary/Chest: Effort normal and breath sounds normal. He has no decreased breath sounds. He has no wheezes. He has no rhonchi. He has no rales.   Neurological: He is alert and oriented to person, place, and time.   Skin: Skin is warm, dry and intact.   Psychiatric: He has a normal mood and affect. His speech is normal and behavior is normal. Judgment and thought content normal. Cognition and memory are normal.   Nursing note and vitals reviewed.      REVIEWED DATA:    Labs:     Lab Results   Component Value Date     04/06/2018    K 4.2 04/06/2018    AST 25 04/06/2018    ALT 35 04/06/2018    BUN 15 04/06/2018    CREATININE 1.21 04/06/2018    EGFRIFNONA 64 04/06/2018    EGFRIFAFRI 77 04/06/2018       Lab Results   Component Value Date    HGBA1C 5.03 04/06/2018       Lab Results   Component Value Date     (H) 04/06/2018    HDL 57 04/06/2018    TRIG 93 04/06/2018    CHOLHDLRATIO 3.98 04/06/2018       Lab Results   Component Value Date    TSH 2.63 04/06/2018       Lab Results   Component Value Date    WBC 5.13 04/06/2018    HGB 16.0 04/06/2018     04/06/2018       No results found for: PROTEIN, GLUCOSEU, BLOODU, NITRITEU, LEUKOCYTESUR    Imaging:         Medical Tests:         Summary of old records / correspondence / consultant report:         Request outside records:         ALLERGIES  No Known Allergies     PFSH:     The following portions of the patient's history were reviewed and updated as appropriate: Allergies / Current Medications / Past Medical History / Surgical History / Social History / Family History    PROBLEM LIST   Patient Active Problem List   Diagnosis   • Cervical pain (neck)   • Espinal's esophagus without dysplasia   • Essential hypertension       PAST MEDICAL HISTORY  Past Medical History:   Diagnosis Date   • Allergic    • Espinal  esophagus    • GERD (gastroesophageal reflux disease)    • Hypertension        SURGICAL HISTORY  Past Surgical History:   Procedure Laterality Date   • COLONOSCOPY     • ENDOSCOPY AND COLONOSCOPY         SOCIAL HISTORY  Social History     Socioeconomic History   • Marital status: Single     Spouse name: Not on file   • Number of children: Not on file   • Years of education: Not on file   • Highest education level: Not on file   Tobacco Use   • Smoking status: Never Smoker   • Smokeless tobacco: Never Used   Substance and Sexual Activity   • Alcohol use: Yes     Alcohol/week: 1.2 oz     Types: 2 Cans of beer per week     Comment: Occasional    • Drug use: No   • Sexual activity: Yes     Partners: Female     Birth control/protection: None       FAMILY HISTORY  Family History   Problem Relation Age of Onset   • Heart disease Mother    • Heart disease Father    • Other Sister    • Thyroid disease Sister          **Dragon Disclaimer:   Much of this encounter note is an electronic transcription/translation of spoken language to printed text. The electronic translation of spoken language may permit erroneous, or at times, nonsensical words or phrases to be inadvertently transcribed. Although I have reviewed the note for such errors, some may still exist.

## 2019-11-02 DIAGNOSIS — I10 ESSENTIAL HYPERTENSION: ICD-10-CM

## 2019-11-04 RX ORDER — AMLODIPINE BESYLATE 5 MG/1
TABLET ORAL
Qty: 90 TABLET | Refills: 3 | Status: SHIPPED | OUTPATIENT
Start: 2019-11-04 | End: 2019-11-11 | Stop reason: ALTCHOICE

## 2019-11-11 ENCOUNTER — OFFICE VISIT (OUTPATIENT)
Dept: INTERNAL MEDICINE | Age: 49
End: 2019-11-11

## 2019-11-11 VITALS
DIASTOLIC BLOOD PRESSURE: 92 MMHG | WEIGHT: 226.4 LBS | HEIGHT: 69 IN | HEART RATE: 78 BPM | TEMPERATURE: 97.6 F | SYSTOLIC BLOOD PRESSURE: 148 MMHG | BODY MASS INDEX: 33.53 KG/M2 | OXYGEN SATURATION: 99 %

## 2019-11-11 DIAGNOSIS — I10 ESSENTIAL HYPERTENSION: Primary | ICD-10-CM

## 2019-11-11 DIAGNOSIS — Z12.11 COLON CANCER SCREENING: ICD-10-CM

## 2019-11-11 DIAGNOSIS — N52.9 ERECTILE DYSFUNCTION, UNSPECIFIED ERECTILE DYSFUNCTION TYPE: ICD-10-CM

## 2019-11-11 DIAGNOSIS — K22.70 BARRETT'S ESOPHAGUS WITHOUT DYSPLASIA: ICD-10-CM

## 2019-11-11 DIAGNOSIS — Z23 ENCOUNTER FOR IMMUNIZATION: ICD-10-CM

## 2019-11-11 PROBLEM — R73.01 IMPAIRED FASTING GLUCOSE: Status: ACTIVE | Noted: 2019-11-11

## 2019-11-11 PROCEDURE — 99214 OFFICE O/P EST MOD 30 MIN: CPT | Performed by: NURSE PRACTITIONER

## 2019-11-11 PROCEDURE — 90674 CCIIV4 VAC NO PRSV 0.5 ML IM: CPT | Performed by: NURSE PRACTITIONER

## 2019-11-11 PROCEDURE — 90471 IMMUNIZATION ADMIN: CPT | Performed by: NURSE PRACTITIONER

## 2019-11-11 RX ORDER — LISINOPRIL 10 MG/1
10 TABLET ORAL NIGHTLY
Qty: 90 TABLET | Refills: 1 | Status: SHIPPED | OUTPATIENT
Start: 2019-11-11 | End: 2020-03-09 | Stop reason: ALTCHOICE

## 2019-11-11 RX ORDER — SILDENAFIL 50 MG/1
50 TABLET, FILM COATED ORAL DAILY PRN
Qty: 10 TABLET | Refills: 1 | Status: SHIPPED | OUTPATIENT
Start: 2019-11-11 | End: 2020-08-14 | Stop reason: SDUPTHER

## 2019-11-11 NOTE — PROGRESS NOTES
[facility] [place] [facility]  Page Portillo / 48 y.o. / male  11/11/2019      ASSESSMENT & PLAN:    Problem List Items Addressed This Visit        Cardiovascular and Mediastinum    Essential hypertension - Primary    Relevant Medications    lisinopril (PRINIVIL,ZESTRIL) 10 MG tablet       Digestive    Espinal's esophagus without dysplasia    Relevant Medications    dexlansoprazole (DEXILANT) 60 MG capsule    Other Relevant Orders    Ambulatory Referral to Gastroenterology      Other Visit Diagnoses     Erectile dysfunction, unspecified erectile dysfunction type        Relevant Medications    sildenafil (VIAGRA) 50 MG tablet    Encounter for immunization        Relevant Orders    Flucelvax Quad=>4Years (PFS) (Completed)    Colon cancer screening        Relevant Orders    Ambulatory Referral to Gastroenterology        Orders Placed This Encounter   Procedures   • Flucelvax Quad=>4Years (PFS)   • Ambulatory Referral to Gastroenterology     New Medications Ordered This Visit   Medications   • lisinopril (PRINIVIL,ZESTRIL) 10 MG tablet     Sig: Take 1 tablet by mouth Every Night.     Dispense:  90 tablet     Refill:  1   • sildenafil (VIAGRA) 50 MG tablet     Sig: Take 1 tablet by mouth Daily As Needed for Erectile Dysfunction.     Dispense:  10 tablet     Refill:  1       Summary/Discussion:    1. Essential hypertension  Discontinue amlodipine, switch to lisinopril 10 mg daily.  Discussed with patient he needs to start monitoring blood pressure at home, will contact me in 2 weeks with blood pressure readings as I am aware that it is difficult for him to get into the office for follow-up related to working 2 jobs.  Continue lifestyle modifications for high blood pressure, high cholesterol, and increased weight. Decrease/eliminate soda, caffeine, alcohol and overall caloric intake. Reduce carbohydrates and sweets in diet.  Continue to improve dietary habits with lean proteins, fresh vegetables,  "fruits, and nuts. Improve aerobic exercise: walking/biking/swimming daily as tolerated, recommend 30 minutes/day at least 5 days/week.     - lisinopril (PRINIVIL,ZESTRIL) 10 MG tablet; Take 1 tablet by mouth Every Night.  Dispense: 90 tablet; Refill: 1    2. Espinal's esophagus without dysplasia  Will once again place referral to gastroenterology for follow-up EGD/colonoscopy for monitoring Espinal's esophagus.    - Ambulatory Referral to Gastroenterology    3. Erectile dysfunction, unspecified erectile dysfunction type  Patient never picked up initial prescription related to cost.  Discussed with patient that medication is not generic, will send refill to take as needed.    - sildenafil (VIAGRA) 50 MG tablet; Take 1 tablet by mouth Daily As Needed for Erectile Dysfunction.  Dispense: 10 tablet; Refill: 1    4. Encounter for immunization    - Flucelvax Quad=>4Years (PFS)    5. Colon cancer screening    - Ambulatory Referral to Gastroenterology        Return in about 4 months (around 3/11/2020) for Next scheduled follow up.    ____________________________________________________________________    MEDICATIONS  Current Outpatient Medications   Medication Sig Dispense Refill   • dexlansoprazole (DEXILANT) 60 MG capsule Take 1 capsule by mouth Daily. 90 capsule 2   • levocetirizine (XYZAL) 5 MG tablet Take 1 tablet by mouth Daily. 90 tablet 2   • mometasone (NASONEX) 50 MCG/ACT nasal spray ONE SPRAY AND EACH NOSTRIL TWICE DAILY 1 each 5   • montelukast (SINGULAIR) 10 MG tablet Take 1 tablet by mouth Every Night. 90 tablet 2   • lisinopril (PRINIVIL,ZESTRIL) 10 MG tablet Take 1 tablet by mouth Every Night. 90 tablet 1   • sildenafil (VIAGRA) 50 MG tablet Take 1 tablet by mouth Daily As Needed for Erectile Dysfunction. 10 tablet 1     No current facility-administered medications for this visit.           VITALS:    Visit Vitals  /92   Pulse 78   Temp 97.6 °F (36.4 °C)   Ht 175.3 cm (69\")   Wt 103 kg (226 lb 6.4 oz) "   SpO2 99%   BMI 33.43 kg/m²       BP Readings from Last 3 Encounters:   11/11/19 148/92   07/08/19 138/98   01/07/19 128/88     Wt Readings from Last 3 Encounters:   11/11/19 103 kg (226 lb 6.4 oz)   07/08/19 101 kg (222 lb 3.2 oz)   01/07/19 102 kg (224 lb)      Body mass index is 33.43 kg/m².    CC:  Main reason(s) for today's visit: Hypertension (4 mo f/u; doesn't feel like his BP medication is working); Espinal's esophagus without dysplasia; and Cervical pain (neck)      HPI:     Hypertension: Patient here for follow-up of elevated blood pressure. He is exercising and is not adherent to low salt diet.  Blood pressure is not well controlled at home. Cardiac symptoms none. Patient denies chest pain, chest pressure/discomfort, dyspnea, exertional chest pressure/discomfort and lower extremity edema.  Cardiovascular risk factors: hypertension, male gender and obesity (BMI >= 30 kg/m2). Use of agents associated with hypertension: none. History of target organ damage: none. He has been out of medication for a few weeks, reports that he does not feel that it was working while taking as BP was still running 130-140s/80s.     GERD: Patient has history of Espinal's esophagus. He has not been compliant with recommendations to follow up with GI for repeat EGD to this point r/t work schedule (currently working 2 jobs). Symptoms are currently controlled on Dexilant.     Patient Care Team:  Page Buitrago APRN as PCP - General (Internal Medicine)    ____________________________________________________________________    REVIEW OF SYSTEMS    Review of Systems   Constitutional: Negative for activity change, appetite change and unexpected weight change.   HENT: Negative for tinnitus.    Eyes: Negative for visual disturbance.   Respiratory: Negative for cough, chest tightness and shortness of breath.    Cardiovascular: Negative for chest pain, palpitations and leg swelling.   Neurological: Negative for dizziness,  light-headedness and headaches.         PHYSICAL EXAMINATION    Physical Exam   Constitutional: He is oriented to person, place, and time. He appears well-developed and well-nourished. He is cooperative. He does not appear ill. No distress.   Cardiovascular: Normal rate, regular rhythm, S1 normal, S2 normal and normal heart sounds.   No murmur heard.  Pulmonary/Chest: Effort normal and breath sounds normal. He has no decreased breath sounds. He has no wheezes. He has no rhonchi. He has no rales.   Neurological: He is alert and oriented to person, place, and time.   Skin: Skin is warm, dry and intact.   Psychiatric: He has a normal mood and affect. His speech is normal and behavior is normal. Judgment and thought content normal. Cognition and memory are normal.   Nursing note and vitals reviewed.      REVIEWED DATA:    Labs:     Lab Results   Component Value Date     07/08/2019    K 4.6 07/08/2019    AST 21 07/08/2019    ALT 24 07/08/2019    BUN 16 07/08/2019    CREATININE 1.04 07/08/2019    CREATININE 1.21 04/06/2018    EGFRIFNONA 76 07/08/2019    EGFRIFAFRI 92 07/08/2019       Lab Results   Component Value Date    HGBA1C 5.03 04/06/2018       Lab Results   Component Value Date     (H) 07/08/2019     (H) 04/06/2018    HDL 54 07/08/2019    HDL 57 04/06/2018    TRIG 53 07/08/2019    TRIG 93 04/06/2018    CHOLHDLRATIO 3.30 07/08/2019    CHOLHDLRATIO 3.98 04/06/2018       Lab Results   Component Value Date    TSH 3.220 07/08/2019       Lab Results   Component Value Date    WBC 4.71 07/08/2019    HGB 16.3 07/08/2019    HGB 16.0 04/06/2018     07/08/2019       No results found for: PROTEIN, GLUCOSEU, BLOODU, NITRITEU, LEUKOCYTESUR    Imaging:         Medical Tests:         Summary of old records / correspondence / consultant report:         Request outside records:         ALLERGIES  No Known Allergies     PFSH:     The following portions of the patient's history were reviewed and updated as  appropriate: Allergies / Current Medications / Past Medical History / Surgical History / Social History / Family History    PROBLEM LIST   Patient Active Problem List   Diagnosis   • Cervical pain (neck)   • Espinal's esophagus without dysplasia   • Essential hypertension   • Impaired fasting glucose       PAST MEDICAL HISTORY  Past Medical History:   Diagnosis Date   • Allergic    • Espinal esophagus    • GERD (gastroesophageal reflux disease)    • Hypertension        SURGICAL HISTORY  Past Surgical History:   Procedure Laterality Date   • COLONOSCOPY     • ENDOSCOPY AND COLONOSCOPY         SOCIAL HISTORY  Social History     Socioeconomic History   • Marital status: Single     Spouse name: Not on file   • Number of children: Not on file   • Years of education: Not on file   • Highest education level: Not on file   Tobacco Use   • Smoking status: Never Smoker   • Smokeless tobacco: Never Used   Substance and Sexual Activity   • Alcohol use: Yes     Alcohol/week: 1.2 oz     Types: 2 Cans of beer per week     Comment: Occasional    • Drug use: No   • Sexual activity: Yes     Partners: Female     Birth control/protection: None       FAMILY HISTORY  Family History   Problem Relation Age of Onset   • Heart disease Mother    • Heart disease Father    • Other Sister    • Thyroid disease Sister          **Jessica Disclaimer:   Much of this encounter note is an electronic transcription/translation of spoken language to printed text. The electronic translation of spoken language may permit erroneous, or at times, nonsensical words or phrases to be inadvertently transcribed. Although I have reviewed the note for such errors, some may still exist.

## 2019-11-11 NOTE — PATIENT INSTRUCTIONS
CALL WITH BLOOD PRESSURE READINGS IN 2 WEEKS!!!     NOTIFY ME IF BLOOD PRESSURE CONSISTENTLY GREATER THAN 140/90 OR IF TOO LOW (DIZZINESS, LIGHTHEADEDNESS)!

## 2019-11-12 ENCOUNTER — PREP FOR SURGERY (OUTPATIENT)
Dept: OTHER | Facility: HOSPITAL | Age: 49
End: 2019-11-12

## 2019-11-12 DIAGNOSIS — K22.70 BARRETT'S ESOPHAGUS WITHOUT DYSPLASIA: Primary | ICD-10-CM

## 2019-11-12 DIAGNOSIS — Z12.11 ENCOUNTER FOR SCREENING FOR MALIGNANT NEOPLASM OF COLON: ICD-10-CM

## 2019-11-12 RX ORDER — SODIUM CHLORIDE, SODIUM LACTATE, POTASSIUM CHLORIDE, CALCIUM CHLORIDE 600; 310; 30; 20 MG/100ML; MG/100ML; MG/100ML; MG/100ML
30 INJECTION, SOLUTION INTRAVENOUS CONTINUOUS
Status: CANCELLED | OUTPATIENT
Start: 2020-01-17

## 2019-11-14 PROBLEM — Z12.11 ENCOUNTER FOR SCREENING FOR MALIGNANT NEOPLASM OF COLON: Status: ACTIVE | Noted: 2019-11-14

## 2020-01-10 ENCOUNTER — TELEPHONE (OUTPATIENT)
Dept: GASTROENTEROLOGY | Facility: CLINIC | Age: 50
End: 2020-01-10

## 2020-01-17 ENCOUNTER — ANESTHESIA EVENT (OUTPATIENT)
Dept: GASTROENTEROLOGY | Facility: HOSPITAL | Age: 50
End: 2020-01-17

## 2020-01-17 ENCOUNTER — HOSPITAL ENCOUNTER (OUTPATIENT)
Facility: HOSPITAL | Age: 50
Setting detail: HOSPITAL OUTPATIENT SURGERY
Discharge: HOME | End: 2020-01-17
Attending: INTERNAL MEDICINE | Admitting: INTERNAL MEDICINE

## 2020-01-17 ENCOUNTER — ANESTHESIA (OUTPATIENT)
Dept: GASTROENTEROLOGY | Facility: HOSPITAL | Age: 50
End: 2020-01-17

## 2020-01-17 VITALS
BODY MASS INDEX: 32.98 KG/M2 | WEIGHT: 222.7 LBS | RESPIRATION RATE: 16 BRPM | DIASTOLIC BLOOD PRESSURE: 88 MMHG | SYSTOLIC BLOOD PRESSURE: 120 MMHG | TEMPERATURE: 98.5 F | OXYGEN SATURATION: 97 % | HEIGHT: 69 IN | HEART RATE: 80 BPM

## 2020-01-17 DIAGNOSIS — K22.70 BARRETT'S ESOPHAGUS WITHOUT DYSPLASIA: ICD-10-CM

## 2020-01-17 DIAGNOSIS — Z12.11 ENCOUNTER FOR SCREENING FOR MALIGNANT NEOPLASM OF COLON: ICD-10-CM

## 2020-01-17 PROCEDURE — 45378 DIAGNOSTIC COLONOSCOPY: CPT | Performed by: INTERNAL MEDICINE

## 2020-01-17 PROCEDURE — 43239 EGD BIOPSY SINGLE/MULTIPLE: CPT | Performed by: INTERNAL MEDICINE

## 2020-01-17 PROCEDURE — 25010000002 PROPOFOL 10 MG/ML EMULSION: Performed by: NURSE ANESTHETIST, CERTIFIED REGISTERED

## 2020-01-17 PROCEDURE — 88305 TISSUE EXAM BY PATHOLOGIST: CPT | Performed by: INTERNAL MEDICINE

## 2020-01-17 RX ORDER — SODIUM CHLORIDE, SODIUM LACTATE, POTASSIUM CHLORIDE, CALCIUM CHLORIDE 600; 310; 30; 20 MG/100ML; MG/100ML; MG/100ML; MG/100ML
30 INJECTION, SOLUTION INTRAVENOUS CONTINUOUS
Status: DISCONTINUED | OUTPATIENT
Start: 2020-01-17 | End: 2020-01-17 | Stop reason: HOSPADM

## 2020-01-17 RX ORDER — LIDOCAINE HYDROCHLORIDE 20 MG/ML
INJECTION, SOLUTION INFILTRATION; PERINEURAL AS NEEDED
Status: DISCONTINUED | OUTPATIENT
Start: 2020-01-17 | End: 2020-01-17 | Stop reason: SURG

## 2020-01-17 RX ORDER — PROPOFOL 10 MG/ML
VIAL (ML) INTRAVENOUS AS NEEDED
Status: DISCONTINUED | OUTPATIENT
Start: 2020-01-17 | End: 2020-01-17 | Stop reason: SURG

## 2020-01-17 RX ORDER — ONDANSETRON 2 MG/ML
4 INJECTION INTRAMUSCULAR; INTRAVENOUS ONCE AS NEEDED
Status: DISCONTINUED | OUTPATIENT
Start: 2020-01-17 | End: 2020-01-17 | Stop reason: HOSPADM

## 2020-01-17 RX ORDER — PROPOFOL 10 MG/ML
VIAL (ML) INTRAVENOUS CONTINUOUS PRN
Status: DISCONTINUED | OUTPATIENT
Start: 2020-01-17 | End: 2020-01-17 | Stop reason: SURG

## 2020-01-17 RX ADMIN — PROPOFOL 200 MCG/KG/MIN: 10 INJECTION, EMULSION INTRAVENOUS at 10:01

## 2020-01-17 RX ADMIN — PROPOFOL 50 MG: 10 INJECTION, EMULSION INTRAVENOUS at 10:01

## 2020-01-17 RX ADMIN — LIDOCAINE HYDROCHLORIDE 40 MG: 20 INJECTION, SOLUTION INFILTRATION; PERINEURAL at 10:01

## 2020-01-17 RX ADMIN — SODIUM CHLORIDE, POTASSIUM CHLORIDE, SODIUM LACTATE AND CALCIUM CHLORIDE 30 ML/HR: 600; 310; 30; 20 INJECTION, SOLUTION INTRAVENOUS at 09:06

## 2020-01-17 RX ADMIN — PROPOFOL 50 MG: 10 INJECTION, EMULSION INTRAVENOUS at 10:04

## 2020-01-17 RX ADMIN — PROPOFOL 50 MG: 10 INJECTION, EMULSION INTRAVENOUS at 10:07

## 2020-01-17 NOTE — ANESTHESIA POSTPROCEDURE EVALUATION
Patient: Ariel Portillo    Procedure Summary     Date:  01/17/20 Room / Location:   [facility]    Anesthesia Start:  0957 Anesthesia Stop:  1033    Procedures:       ESOPHAGOGASTRODUODENOSCOPY with biopsies (N/A Esophagus)      COLONOSCOPY into cecum (N/A ) Diagnosis:       Espinal's esophagus without dysplasia      Encounter for screening for malignant neoplasm of colon      (Espinal's esophagus without dysplasia [K22.70])      (Encounter for screening for malignant neoplasm of colon [Z12.11])    Surgeon:  Isaac Vásquez MD Provider:  Travis Colindres MD    Anesthesia Type:  MAC ASA Status:  1          Anesthesia Type: MAC    Vitals  Vitals Value Taken Time   /88 1/17/2020 10:53 AM   Temp     Pulse 80 1/17/2020 10:53 AM   Resp 16 1/17/2020 10:53 AM   SpO2 97 % 1/17/2020 10:53 AM           Post Anesthesia Care and Evaluation    Patient location during evaluation: bedside  Patient participation: complete - patient participated  Level of consciousness: awake and alert  Pain score: 0  Pain management: adequate  Airway patency: patent  Anesthetic complications: No anesthetic complications  PONV Status: none  Cardiovascular status: acceptable  Respiratory status: acceptable  Hydration status: acceptable  Post Neuraxial Block status: Motor and sensory function returned to baseline

## 2020-01-17 NOTE — H&P
CC: Here for endoscopic evaluation.     HPI: Barretts. Screening.     Past Medical History:   Diagnosis Date   • Allergic    • Espinal esophagus    • Esophageal dilatation    • GERD (gastroesophageal reflux disease)    • Hypertension        Past Surgical History:   Procedure Laterality Date   • COLONOSCOPY  2013   • ENDOSCOPY  2013   • ENDOSCOPY AND COLONOSCOPY         Prior to Admission medications    Medication Sig Start Date End Date Taking? Authorizing Provider   sildenafil (VIAGRA) 50 MG tablet Take 1 tablet by mouth Daily As Needed for Erectile Dysfunction. 11/11/19  Yes Page Buitrago APRN   dexlansoprazole (DEXILANT) 60 MG capsule Take 1 capsule by mouth Daily. 4/19/19   Page Buitrago APRN   levocetirizine (XYZAL) 5 MG tablet Take 1 tablet by mouth Daily. 11/20/18   Page Buitrago APRN   lisinopril (PRINIVIL,ZESTRIL) 10 MG tablet Take 1 tablet by mouth Every Night. 11/11/19   Page Buitrago APRN   mometasone (NASONEX) 50 MCG/ACT nasal spray ONE SPRAY AND EACH NOSTRIL TWICE DAILY 11/20/18   Page Buitrago APRN   montelukast (SINGULAIR) 10 MG tablet Take 1 tablet by mouth Every Night. 4/19/19   Page Buitrago APRN       No Known Allergies    Family History   Problem Relation Age of Onset   • Heart disease Mother    • Heart disease Father    • Other Sister    • Thyroid disease Sister        OBJECTIVE:    Patient's vital signs reviewed. No acute distress.     Chest is clear, no wheezing or rales. Normal symmetric air entry throughout both lung fields. No chest wall deformities or tenderness.    S1 and S2 normal, no murmurs, clicks, gallops or rubs. Regular rate and rhythm. Chest is clear; no wheezes or rales. No edema or JVD.    The abdomen is soft without tenderness, guarding, mass, rebound or organomegaly. Bowel sounds are normal. No CVA tenderness or inguinal adenopathy noted.    Patient is alert, oriented and with an intact memory.    Assessment: Barretts. Screening.     Plan: EGD.  Colonoscopy.

## 2020-01-17 NOTE — ANESTHESIA PREPROCEDURE EVALUATION
Anesthesia Evaluation     Patient summary reviewed   NPO Solid Status: > 8 hours  NPO Liquid Status: > 6 hours           Airway   Mallampati: III  TM distance: >3 FB  Neck ROM: full  No difficulty expected  Dental - normal exam     Pulmonary     breath sounds clear to auscultation  Cardiovascular   Exercise tolerance: good (4-7 METS)    Rhythm: regular  Rate: normal        Neuro/Psych  GI/Hepatic/Renal/Endo      Musculoskeletal     Abdominal    Substance History      OB/GYN          Other                        Anesthesia Plan    ASA 1     MAC     intravenous induction     Anesthetic plan, all risks, benefits, and alternatives have been provided, discussed and informed consent has been obtained with: patient.

## 2020-01-17 NOTE — DISCHARGE INSTRUCTIONS
For the next 24 hours patient needs to be with a responsible adult.    For 24 hours DO NOT drive, operate machinery, appliances, drink alcohol, make important decisions or sign legal documents.    Start with a light or bland diet if you are feeling sick to your stomach otherwise advance to regular diet as tolerated.    Follow recommendations on procedure report if provided by your doctor.    Call Dr Vásquez for problems [phone]    Problems may include but not limited to: large amounts of bleeding, trouble breathing, repeated vomiting, severe unrelieved pain, fever or chills.

## 2020-01-20 LAB
CYTO UR: NORMAL
LAB AP CASE REPORT: NORMAL
PATH REPORT.FINAL DX SPEC: NORMAL
PATH REPORT.GROSS SPEC: NORMAL

## 2020-01-29 RX ORDER — LEVOCETIRIZINE DIHYDROCHLORIDE 5 MG/1
TABLET, FILM COATED ORAL
Qty: 90 TABLET | Refills: 3 | Status: SHIPPED | OUTPATIENT
Start: 2020-01-29 | End: 2021-09-16

## 2020-02-21 ENCOUNTER — DOCUMENTATION (OUTPATIENT)
Dept: INTERNAL MEDICINE | Age: 50
End: 2020-02-21

## 2020-02-21 DIAGNOSIS — R05.9 COUGH: Primary | ICD-10-CM

## 2020-02-21 RX ORDER — BENZONATATE 200 MG/1
200 CAPSULE ORAL 3 TIMES DAILY PRN
Qty: 30 CAPSULE | Refills: 0 | Status: SHIPPED | OUTPATIENT
Start: 2020-02-21 | End: 2020-07-13

## 2020-02-21 NOTE — PROGRESS NOTES
Patient having persistent cough after recent illness. No fever, SOA.   Requesting something for cough to go to pharmacy. Has been taking delsym without relief.

## 2020-03-09 ENCOUNTER — OFFICE VISIT (OUTPATIENT)
Dept: INTERNAL MEDICINE | Age: 50
End: 2020-03-09

## 2020-03-09 VITALS
TEMPERATURE: 97.9 F | WEIGHT: 231.8 LBS | HEIGHT: 69 IN | SYSTOLIC BLOOD PRESSURE: 140 MMHG | OXYGEN SATURATION: 97 % | HEART RATE: 97 BPM | BODY MASS INDEX: 34.33 KG/M2 | DIASTOLIC BLOOD PRESSURE: 100 MMHG

## 2020-03-09 DIAGNOSIS — K22.70 BARRETT'S ESOPHAGUS WITHOUT DYSPLASIA: ICD-10-CM

## 2020-03-09 DIAGNOSIS — J30.9 ALLERGIC RHINITIS, UNSPECIFIED SEASONALITY, UNSPECIFIED TRIGGER: ICD-10-CM

## 2020-03-09 DIAGNOSIS — R73.01 IMPAIRED FASTING GLUCOSE: ICD-10-CM

## 2020-03-09 DIAGNOSIS — I10 ESSENTIAL HYPERTENSION: Primary | ICD-10-CM

## 2020-03-09 PROCEDURE — 99214 OFFICE O/P EST MOD 30 MIN: CPT | Performed by: NURSE PRACTITIONER

## 2020-03-09 RX ORDER — VALSARTAN 80 MG/1
80 TABLET ORAL DAILY
Qty: 90 TABLET | Refills: 3 | Status: SHIPPED | OUTPATIENT
Start: 2020-03-09 | End: 2020-07-13

## 2020-03-09 NOTE — PROGRESS NOTES
[facility] – [place] INTERNAL MEDICINE  Page Portillo / 49 y.o. / male  03/09/2020      ASSESSMENT & PLAN:    Problem List Items Addressed This Visit        Cardiovascular and Mediastinum    Essential hypertension - Primary    Relevant Medications    valsartan (DIOVAN) 80 MG tablet       Digestive    Espinal's esophagus without dysplasia    Relevant Medications    dexlansoprazole (DEXILANT) 60 MG capsule       Endocrine    Impaired fasting glucose      Other Visit Diagnoses     Allergic rhinitis, unspecified seasonality, unspecified trigger            No orders of the defined types were placed in this encounter.    New Medications Ordered This Visit   Medications   • valsartan (DIOVAN) 80 MG tablet     Sig: Take 1 tablet by mouth Daily.     Dispense:  90 tablet     Refill:  3       Summary/Discussion:    1. Essential hypertension  Persistent cough, Recommend switch ACEI to valsartan, increase dosage to 80mg daily.  Instructed patient to notify me of home blood pressure readings in approximately 2 weeks, may need to consider further adjustment of dosage at that time.     Continue lifestyle modifications for high blood pressure, high cholesterol, and increased weight. Decrease/eliminate soda, caffeine, alcohol and overall caloric intake. Reduce carbohydrates and sweets in diet.  Continue to improve dietary habits with lean proteins, fresh vegetables, fruits, and nuts. Improve aerobic exercise: walking/biking/swimming daily as tolerated, recommend 30 minutes/day at least 5 days/week.       - valsartan (DIOVAN) 80 MG tablet; Take 1 tablet by mouth Daily.  Dispense: 90 tablet; Refill: 3    2. Espinal's esophagus without dysplasia  UTD EGD. Recent EGD and colonoscopy.  Colonoscopy normal, repeat in 10 years.  EGD normal, repeat 3 to 5 years. Continue PPI as prescribed by GI.     3. Impaired fasting glucose  Recheck A1c/FBG next office visit.     4. Allergic rhinitis, unspecified seasonality, unspecified  "trigger  Instructed patient that he should be taking his levocetirizine in addition to montelukast every night.  Continue Nasonex.           Return in about 4 months (around 7/9/2020) for Next scheduled follow up with fasting lab .  ____________________________________________________________________    MEDICATIONS  Current Outpatient Medications   Medication Sig Dispense Refill   • benzonatate (TESSALON) 200 MG capsule Take 1 capsule by mouth 3 (Three) Times a Day As Needed for Cough. 30 capsule 0   • dexlansoprazole (DEXILANT) 60 MG capsule Take 1 capsule by mouth Daily. 90 capsule 2   • mometasone (NASONEX) 50 MCG/ACT nasal spray ONE SPRAY AND EACH NOSTRIL TWICE DAILY 1 each 5   • montelukast (SINGULAIR) 10 MG tablet Take 1 tablet by mouth Every Night. 90 tablet 2   • levocetirizine (XYZAL) 5 MG tablet TAKE 1 TABLET BY MOUTH EVERY DAY 90 tablet 3   • sildenafil (VIAGRA) 50 MG tablet Take 1 tablet by mouth Daily As Needed for Erectile Dysfunction. 10 tablet 1   • valsartan (DIOVAN) 80 MG tablet Take 1 tablet by mouth Daily. 90 tablet 3     No current facility-administered medications for this visit.        VITALS    Visit Vitals  /100   Pulse 97   Temp 97.9 °F (36.6 °C)   Ht 175.3 cm (69.02\")   Wt 105 kg (231 lb 12.8 oz)   SpO2 97%   BMI 34.22 kg/m²       BP Readings from Last 3 Encounters:   03/09/20 140/100   01/17/20 120/88   11/11/19 148/92     Wt Readings from Last 3 Encounters:   03/09/20 105 kg (231 lb 12.8 oz)   01/17/20 101 kg (222 lb 11.2 oz)   11/11/19 103 kg (226 lb 6.4 oz)      Body mass index is 34.22 kg/m².    CC:  Main reason(s) for today's visit: Follow-up for hypertension and hyperlipidemia    HPI:   Chronic essential hypertension:  Since prior visit: compliant with medication(s), does not check blood pressure at home and denies significant problems with medication(s). Currently taking lisinopril (Prinivil). He is exercising, but is adherent to low sodium diet.  He denies symptoms of chest " pain/pressure, dyspnea, swelling, vision impairment. CVD risk factors include: HTN, obesity (BMI>=30 kg/m2). Use of agents associated with HTN: no tobacco use . Most recent in-office blood pressure readings:   BP Readings from Last 3 Encounters:   03/09/20 140/100   01/17/20 120/88   11/11/19 148/92        Cough: Reports continued ongoing cough, has had CXR and PFTs which were WNL. Recently switched amlodipine to lisinopril in November.  He currently taking Dexilant as prescribed by gastroenterology for history of Espinal's esophagus.  Denies any worsening GERD symptoms.    Recent EGD and colonoscopy.  Colonoscopy normal, repeat in 10 years.  EGD normal, repeat 3 to 5 years.    Patient Care Team:  Page Buitrago APRN as PCP - General (Internal Medicine)  ____________________________________________________________________      REVIEW OF SYSTEMS    Review of Systems   Constitutional: Negative for activity change, appetite change and unexpected weight change.   HENT: Positive for ear pain. Negative for tinnitus.    Eyes: Negative for visual disturbance.   Respiratory: Negative for cough, chest tightness and shortness of breath.    Cardiovascular: Negative for chest pain, palpitations and leg swelling.   Neurological: Negative for dizziness, light-headedness and headaches.         PHYSICAL EXAMINATION    Physical Exam   Constitutional: He is oriented to person, place, and time. Vital signs are normal. He appears well-developed and well-nourished. He is cooperative. He does not appear ill. No distress.   HENT:   Right Ear: Hearing, external ear and ear canal normal. A middle ear effusion is present.   Left Ear: Hearing, tympanic membrane, external ear and ear canal normal.   Cardiovascular: Normal rate, regular rhythm, S1 normal, S2 normal and normal heart sounds.   No murmur heard.  Pulmonary/Chest: Effort normal and breath sounds normal. He has no decreased breath sounds. He has no wheezes. He has no rhonchi. He  has no rales.   Neurological: He is alert and oriented to person, place, and time.   Skin: Skin is warm, dry and intact.   Psychiatric: He has a normal mood and affect. His speech is normal and behavior is normal. Judgment and thought content normal. Cognition and memory are normal.   Nursing note and vitals reviewed.      REVIEWED DATA:    Labs:   Lab Results   Component Value Date     07/08/2019    K 4.6 07/08/2019    AST 21 07/08/2019    ALT 24 07/08/2019    BUN 16 07/08/2019    CREATININE 1.04 07/08/2019    CREATININE 1.21 04/06/2018    EGFRIFNONA 76 07/08/2019    EGFRIFAFRI 92 07/08/2019       Lab Results   Component Value Date    HGBA1C 5.03 04/06/2018       Lab Results   Component Value Date     (H) 07/08/2019     (H) 04/06/2018    HDL 54 07/08/2019    HDL 57 04/06/2018    TRIG 53 07/08/2019    TRIG 93 04/06/2018    CHOLHDLRATIO 3.30 07/08/2019    CHOLHDLRATIO 3.98 04/06/2018       Lab Results   Component Value Date    TSH 3.220 07/08/2019          Lab Results   Component Value Date    WBC 4.71 07/08/2019    HGB 16.3 07/08/2019    HGB 16.0 04/06/2018     07/08/2019       No results found for: PROTEIN, GLUCOSEU, BLOODU, NITRITEU, LEUKOCYTESUR    Imaging:        Medical Tests:        Summary of old records / correspondence / consultant report:        Request outside records:        ALLERGIES  No Known Allergies     PFSH:     The following portions of the patient's history were reviewed and updated as appropriate: Allergies / Current Medications / Past Medical History / Surgical History / Social History / Family History    PROBLEM LIST   Patient Active Problem List   Diagnosis   • Cervical pain (neck)   • Espinal's esophagus without dysplasia   • Essential hypertension   • Impaired fasting glucose   • Encounter for screening for malignant neoplasm of colon       PAST MEDICAL HISTORY  Past Medical History:   Diagnosis Date   • Allergic    • Espinal esophagus    • Esophageal dilatation     • GERD (gastroesophageal reflux disease)    • Hypertension        SURGICAL HISTORY  Past Surgical History:   Procedure Laterality Date   • COLONOSCOPY  2013   • COLONOSCOPY N/A 1/17/2020    Procedure: COLONOSCOPY into cecum;  Surgeon: Isaac Vásquez MD;  Location: [facility] ENDOSCOPY;  Service: Gastroenterology   • ENDOSCOPY  2013   • ENDOSCOPY N/A 1/17/2020    Procedure: ESOPHAGOGASTRODUODENOSCOPY with biopsies;  Surgeon: Isaac Vásquez MD;  Location: [facility] ENDOSCOPY;  Service: Gastroenterology   • ENDOSCOPY AND COLONOSCOPY         SOCIAL HISTORY  Social History     Socioeconomic History   • Marital status: Single     Spouse name: Not on file   • Number of children: Not on file   • Years of education: Not on file   • Highest education level: Not on file   Tobacco Use   • Smoking status: Never Smoker   • Smokeless tobacco: Never Used   Substance and Sexual Activity   • Alcohol use: Yes     Alcohol/week: 2.0 standard drinks     Types: 2 Cans of beer per week     Comment: Occasional    • Drug use: No   • Sexual activity: Yes     Partners: Female     Birth control/protection: None       FAMILY HISTORY  Family History   Problem Relation Age of Onset   • Heart disease Mother    • Heart disease Father    • Other Sister    • Thyroid disease Sister

## 2020-05-03 DIAGNOSIS — R05.8 ALLERGIC COUGH: ICD-10-CM

## 2020-05-04 RX ORDER — MONTELUKAST SODIUM 10 MG/1
TABLET ORAL
Qty: 90 TABLET | Refills: 2 | Status: SHIPPED | OUTPATIENT
Start: 2020-05-04 | End: 2021-03-22 | Stop reason: SDUPTHER

## 2020-05-12 DIAGNOSIS — I10 ESSENTIAL HYPERTENSION: ICD-10-CM

## 2020-05-12 RX ORDER — LISINOPRIL 10 MG/1
TABLET ORAL
Qty: 90 TABLET | Refills: 1 | OUTPATIENT
Start: 2020-05-12

## 2020-05-19 RX ORDER — DEXLANSOPRAZOLE 60 MG/1
CAPSULE, DELAYED RELEASE ORAL
Qty: 90 CAPSULE | Refills: 2 | Status: SHIPPED | OUTPATIENT
Start: 2020-05-19 | End: 2021-02-15

## 2020-07-13 ENCOUNTER — OFFICE VISIT (OUTPATIENT)
Dept: INTERNAL MEDICINE | Age: 50
End: 2020-07-13

## 2020-07-13 VITALS
DIASTOLIC BLOOD PRESSURE: 80 MMHG | OXYGEN SATURATION: 98 % | TEMPERATURE: 97.2 F | SYSTOLIC BLOOD PRESSURE: 132 MMHG | WEIGHT: 235 LBS | HEART RATE: 85 BPM | BODY MASS INDEX: 34.8 KG/M2 | HEIGHT: 69 IN

## 2020-07-13 DIAGNOSIS — Z12.5 ENCOUNTER FOR PROSTATE CANCER SCREENING: ICD-10-CM

## 2020-07-13 DIAGNOSIS — Z13.220 SCREENING FOR LIPID DISORDERS: ICD-10-CM

## 2020-07-13 DIAGNOSIS — J30.9 ALLERGIC RHINITIS, UNSPECIFIED SEASONALITY, UNSPECIFIED TRIGGER: ICD-10-CM

## 2020-07-13 DIAGNOSIS — I10 ESSENTIAL HYPERTENSION: Primary | ICD-10-CM

## 2020-07-13 DIAGNOSIS — R73.01 IMPAIRED FASTING GLUCOSE: ICD-10-CM

## 2020-07-13 DIAGNOSIS — K22.70 BARRETT'S ESOPHAGUS WITHOUT DYSPLASIA: ICD-10-CM

## 2020-07-13 DIAGNOSIS — M25.50 ARTHRALGIA, UNSPECIFIED JOINT: ICD-10-CM

## 2020-07-13 LAB
ALBUMIN SERPL-MCNC: 4.6 G/DL (ref 3.5–5.2)
ALBUMIN/GLOB SERPL: 2 G/DL
ALP SERPL-CCNC: 63 U/L (ref 39–117)
ALT SERPL-CCNC: 29 U/L (ref 1–41)
AST SERPL-CCNC: 26 U/L (ref 1–40)
BILIRUB SERPL-MCNC: 0.8 MG/DL (ref 0–1.2)
BUN SERPL-MCNC: 22 MG/DL (ref 6–20)
BUN/CREAT SERPL: 17.3 (ref 7–25)
CALCIUM SERPL-MCNC: 9.5 MG/DL (ref 8.6–10.5)
CHLORIDE SERPL-SCNC: 102 MMOL/L (ref 98–107)
CHOLEST SERPL-MCNC: 209 MG/DL (ref 0–200)
CHOLEST/HDLC SERPL: 4.35 {RATIO}
CO2 SERPL-SCNC: 28.7 MMOL/L (ref 22–29)
CREAT SERPL-MCNC: 1.27 MG/DL (ref 0.76–1.27)
GLOBULIN SER CALC-MCNC: 2.3 GM/DL
GLUCOSE SERPL-MCNC: 97 MG/DL (ref 65–99)
HBA1C MFR BLD: 5.1 % (ref 4.8–5.6)
HDLC SERPL-MCNC: 48 MG/DL (ref 40–60)
LDLC SERPL CALC-MCNC: 142 MG/DL (ref 0–100)
POTASSIUM SERPL-SCNC: 4.2 MMOL/L (ref 3.5–5.2)
PROT SERPL-MCNC: 6.9 G/DL (ref 6–8.5)
PSA SERPL-MCNC: 1.17 NG/ML (ref 0–4)
SODIUM SERPL-SCNC: 140 MMOL/L (ref 136–145)
TRIGL SERPL-MCNC: 94 MG/DL (ref 0–150)
VLDLC SERPL CALC-MCNC: 18.8 MG/DL

## 2020-07-13 PROCEDURE — 99214 OFFICE O/P EST MOD 30 MIN: CPT | Performed by: NURSE PRACTITIONER

## 2020-07-13 RX ORDER — AMLODIPINE BESYLATE 5 MG/1
5 TABLET ORAL DAILY
COMMUNITY
Start: 2020-05-13 | End: 2020-07-13

## 2020-07-13 RX ORDER — VALSARTAN 160 MG/1
160 TABLET ORAL DAILY
Qty: 90 TABLET | Refills: 1 | Status: SHIPPED | OUTPATIENT
Start: 2020-07-13 | End: 2020-11-04

## 2020-07-13 RX ORDER — MOMETASONE FUROATE 50 UG/1
SPRAY, METERED NASAL
Qty: 1 EACH | Refills: 5 | Status: SHIPPED | OUTPATIENT
Start: 2020-07-13 | End: 2021-05-26

## 2020-07-13 NOTE — PROGRESS NOTES
[facility] [place]  Page Portillo / 49 y.o. / male  07/13/2020      ASSESSMENT & PLAN:    Problem List Items Addressed This Visit        Cardiovascular and Mediastinum    Essential hypertension - Primary    Relevant Medications    valsartan (DIOVAN) 160 MG tablet    Other Relevant Orders    Comprehensive Metabolic Panel       Digestive    Espinal's esophagus without dysplasia    Relevant Medications    DEXILANT 60 MG capsule       Endocrine    Impaired fasting glucose    Relevant Orders    Hemoglobin A1c      Other Visit Diagnoses     Screening for lipid disorders        Relevant Orders    Lipid Panel With / Chol / HDL Ratio    Encounter for prostate cancer screening        Relevant Orders    PSA Screen    Allergic rhinitis, unspecified seasonality, unspecified trigger        Relevant Medications    mometasone (NASONEX) 50 MCG/ACT nasal spray    Arthralgia, unspecified joint        Relevant Medications    diclofenac (VOLTAREN) 1 % gel gel        Orders Placed This Encounter   Procedures   • Comprehensive Metabolic Panel   • Hemoglobin A1c   • Lipid Panel With / Chol / HDL Ratio   • PSA Screen     New Medications Ordered This Visit   Medications   • mometasone (NASONEX) 50 MCG/ACT nasal spray     Sig: ONE SPRAY AND EACH NOSTRIL TWICE DAILY     Dispense:  1 each     Refill:  5   • valsartan (DIOVAN) 160 MG tablet     Sig: Take 1 tablet by mouth Daily.     Dispense:  90 tablet     Refill:  1   • diclofenac (VOLTAREN) 1 % gel gel     Sig: Apply 4 g topically to the appropriate area as directed 4 (Four) Times a Day As Needed (pain).       Summary/Discussion:    1. Essential hypertension  Blood pressure suboptimally controlled.  Recommend increase in valsartan to 160 mg daily.  Continue lifestyle modifications for high blood pressure, high cholesterol, and increased weight. Decrease/eliminate soda, caffeine, alcohol and overall caloric intake. Reduce carbohydrates and sweets in  diet.  Continue to improve dietary habits with lean proteins, fresh vegetables, fruits, and nuts. Improve aerobic exercise: walking/biking/swimming daily as tolerated, recommend 30 minutes/day at least 5 days/week.     Follow up 4 months.     - Comprehensive Metabolic Panel  - valsartan (DIOVAN) 160 MG tablet; Take 1 tablet by mouth Daily.  Dispense: 90 tablet; Refill: 1    2. Espinal's esophagus without dysplasia  Continue PPI.     3. Impaired fasting glucose    - Hemoglobin A1c    4. Screening for lipid disorders    - Lipid Panel With / Chol / HDL Ratio    5. Encounter for prostate cancer screening    - PSA Screen    6. Allergic rhinitis, unspecified seasonality, unspecified trigger    - mometasone (NASONEX) 50 MCG/ACT nasal spray; ONE SPRAY AND EACH NOSTRIL TWICE DAILY  Dispense: 1 each; Refill: 5    7. Arthralgia, unspecified joint  Can use over-the-counter NSAIDs sparingly related to Espinal's esophagus and elevated blood pressure.  Also recommended he could use Voltaren gel as needed over-the-counter.  We also discussed use of turmeric 1000 2000 mg daily as a general anti-inflammatory if he wishes to try this.    - diclofenac (VOLTAREN) 1 % gel gel; Apply 4 g topically to the appropriate area as directed 4 (Four) Times a Day As Needed (pain).        Return in about 4 months (around 11/13/2020) for Next scheduled follow up.  ____________________________________________________________________    MEDICATIONS  Current Outpatient Medications   Medication Sig Dispense Refill   • DEXILANT 60 MG capsule TAKE 1 CAPSULE BY MOUTH EVERY DAY 90 capsule 2   • levocetirizine (XYZAL) 5 MG tablet TAKE 1 TABLET BY MOUTH EVERY DAY 90 tablet 3   • mometasone (NASONEX) 50 MCG/ACT nasal spray ONE SPRAY AND EACH NOSTRIL TWICE DAILY 1 each 5   • montelukast (SINGULAIR) 10 MG tablet TAKE 1 TABLET BY MOUTH EVERY DAY AT NIGHT 90 tablet 2   • sildenafil (VIAGRA) 50 MG tablet Take 1 tablet by mouth Daily As Needed for Erectile  "Dysfunction. 10 tablet 1   • valsartan (DIOVAN) 160 MG tablet Take 1 tablet by mouth Daily. 90 tablet 1   • diclofenac (VOLTAREN) 1 % gel gel Apply 4 g topically to the appropriate area as directed 4 (Four) Times a Day As Needed (pain).       No current facility-administered medications for this visit.        VITALS    Visit Vitals  /80   Pulse 85   Temp 97.2 °F (36.2 °C) (Temporal)   Ht 175.3 cm (69.02\")   Wt 107 kg (235 lb)   SpO2 98%   BMI 34.69 kg/m²       BP Readings from Last 3 Encounters:   07/13/20 132/80   03/09/20 140/100   01/17/20 120/88     Wt Readings from Last 3 Encounters:   07/13/20 107 kg (235 lb)   03/09/20 105 kg (231 lb 12.8 oz)   01/17/20 101 kg (222 lb 11.2 oz)      Body mass index is 34.69 kg/m².    CC:  Main reason(s) for today's visit: Follow-up for hypertension and hyperlipidemia    HPI:   Chronic essential hypertension:  Since prior visit: compliant with medication(s), checks blood pressure occasionally, denies significant problems with medication(s) and SBP > 130. Currently taking valsartan (Diovan). He is exercising, but is not adherent to low sodium diet. BP readings at home are stable. He denies symptoms of chest pain/pressure, dyspnea, swelling, vision impairment. CVD risk factors include: advanced age (>55 for males, >65 for females), dyslipidemia, HTN, obesity (BMI>=30 kg/m2), and sedentary lifestyle. Use of agents associated with HTN: no tobacco use . Most recent in-office blood pressure readings:   BP Readings from Last 3 Encounters:   07/13/20 132/80   03/09/20 140/100   01/17/20 120/88       Chronic hyperlipidemia:  Current therapy include no prior medication.    Most recent labs:   Lab Results   Component Value Date     (H) 07/08/2019     (H) 04/06/2018    HDL 54 07/08/2019    HDL 57 04/06/2018    TRIG 53 07/08/2019    CHOLHDLRATIO 3.30 07/08/2019    CHOLHDLRATIO 3.98 04/06/2018      Joint/Muscle Pain: Patient complains of arthralgias for which has been " present for several months. Pain is located in multiple joints, is described as aching, and is intermittent .  Associated symptoms include: none.  The patient has tried nothing for pain relief.  Related to injury:   no.      Patient Care Team:  Page Buitrago APRN as PCP - General (Internal Medicine)  ____________________________________________________________________      REVIEW OF SYSTEMS    Review of Systems   Constitutional: Negative for activity change, appetite change and unexpected weight change.   HENT: Positive for rhinorrhea and sneezing. Negative for tinnitus.    Eyes: Negative for visual disturbance.   Respiratory: Negative for cough, chest tightness and shortness of breath.    Cardiovascular: Negative for chest pain, palpitations and leg swelling.   Allergic/Immunologic: Positive for environmental allergies.   Neurological: Negative for dizziness, light-headedness and headaches.         PHYSICAL EXAMINATION    Physical Exam   Constitutional: He is oriented to person, place, and time. Vital signs are normal. He appears well-developed and well-nourished. He is cooperative. He does not appear ill. No distress.   HENT:   Right Ear: Hearing, tympanic membrane, external ear and ear canal normal.   Left Ear: Hearing, tympanic membrane, external ear and ear canal normal.   Cardiovascular: Normal rate, regular rhythm, S1 normal, S2 normal and normal heart sounds.   No murmur heard.  Pulmonary/Chest: Effort normal and breath sounds normal. He has no decreased breath sounds. He has no wheezes. He has no rhonchi. He has no rales.   Neurological: He is alert and oriented to person, place, and time.   Skin: Skin is warm, dry and intact.   Psychiatric: He has a normal mood and affect. His speech is normal and behavior is normal. Judgment and thought content normal. Cognition and memory are normal.   Nursing note and vitals reviewed.      REVIEWED DATA:    Labs:   Lab Results   Component Value Date      07/08/2019    K 4.6 07/08/2019    AST 21 07/08/2019    ALT 24 07/08/2019    BUN 16 07/08/2019    CREATININE 1.04 07/08/2019    CREATININE 1.21 04/06/2018    EGFRIFNONA 76 07/08/2019    EGFRIFAFRI 92 07/08/2019       Lab Results   Component Value Date    HGBA1C 5.03 04/06/2018       Lab Results   Component Value Date     (H) 07/08/2019     (H) 04/06/2018    HDL 54 07/08/2019    HDL 57 04/06/2018    TRIG 53 07/08/2019    TRIG 93 04/06/2018    CHOLHDLRATIO 3.30 07/08/2019    CHOLHDLRATIO 3.98 04/06/2018       Lab Results   Component Value Date    TSH 3.220 07/08/2019          Lab Results   Component Value Date    WBC 4.71 07/08/2019    HGB 16.3 07/08/2019    HGB 16.0 04/06/2018     07/08/2019       No results found for: PROTEIN, GLUCOSEU, BLOODU, NITRITEU, LEUKOCYTESUR    Imaging:        Medical Tests:        Summary of old records / correspondence / consultant report:        Request outside records:        ALLERGIES  No Known Allergies     PFSH:     The following portions of the patient's history were reviewed and updated as appropriate: Allergies / Current Medications / Past Medical History / Surgical History / Social History / Family History    PROBLEM LIST   Patient Active Problem List   Diagnosis   • Cervical pain (neck)   • Espinal's esophagus without dysplasia   • Essential hypertension   • Impaired fasting glucose   • Encounter for screening for malignant neoplasm of colon       PAST MEDICAL HISTORY  Past Medical History:   Diagnosis Date   • Allergic    • Espinal esophagus    • Esophageal dilatation    • GERD (gastroesophageal reflux disease)    • Hypertension        SURGICAL HISTORY  Past Surgical History:   Procedure Laterality Date   • COLONOSCOPY  2013   • COLONOSCOPY N/A 1/17/2020    Procedure: COLONOSCOPY into cecum;  Surgeon: Isaac Vásquez MD;  Location: [facility] ENDOSCOPY;  Service: Gastroenterology   • ENDOSCOPY  2013   • ENDOSCOPY N/A 1/17/2020    Procedure:  ESOPHAGOGASTRODUODENOSCOPY with biopsies;  Surgeon: Isaac Vásquez MD;  Location: [facility] ENDOSCOPY;  Service: Gastroenterology   • ENDOSCOPY AND COLONOSCOPY         SOCIAL HISTORY  Social History     Socioeconomic History   • Marital status: Single     Spouse name: Not on file   • Number of children: Not on file   • Years of education: Not on file   • Highest education level: Not on file   Tobacco Use   • Smoking status: Never Smoker   • Smokeless tobacco: Never Used   Substance and Sexual Activity   • Alcohol use: Yes     Alcohol/week: 2.0 standard drinks     Types: 2 Cans of beer per week     Comment: Occasional    • Drug use: No   • Sexual activity: Yes     Partners: Female     Birth control/protection: None       FAMILY HISTORY  Family History   Problem Relation Age of Onset   • Heart disease Mother    • Heart disease Father    • Other Sister    • Thyroid disease Sister

## 2020-07-14 DIAGNOSIS — R97.20 INCREASED PROSTATE SPECIFIC ANTIGEN (PSA) VELOCITY: Primary | ICD-10-CM

## 2020-07-19 ENCOUNTER — RESULTS ENCOUNTER (OUTPATIENT)
Dept: INTERNAL MEDICINE | Age: 50
End: 2020-07-19

## 2020-07-19 DIAGNOSIS — R97.20 INCREASED PROSTATE SPECIFIC ANTIGEN (PSA) VELOCITY: ICD-10-CM

## 2020-08-14 DIAGNOSIS — N52.9 ERECTILE DYSFUNCTION, UNSPECIFIED ERECTILE DYSFUNCTION TYPE: ICD-10-CM

## 2020-08-14 RX ORDER — SILDENAFIL 50 MG/1
50 TABLET, FILM COATED ORAL DAILY PRN
Qty: 10 TABLET | Refills: 1 | Status: SHIPPED | OUTPATIENT
Start: 2020-08-14 | End: 2020-08-17 | Stop reason: SDUPTHER

## 2020-08-17 DIAGNOSIS — N52.9 ERECTILE DYSFUNCTION, UNSPECIFIED ERECTILE DYSFUNCTION TYPE: ICD-10-CM

## 2020-08-17 RX ORDER — SILDENAFIL 50 MG/1
25 TABLET, FILM COATED ORAL DAILY PRN
Qty: 30 TABLET | Refills: 1 | Status: SHIPPED | OUTPATIENT
Start: 2020-08-17 | End: 2020-11-04 | Stop reason: SDUPTHER

## 2020-09-10 ENCOUNTER — TELEPHONE (OUTPATIENT)
Dept: INTERNAL MEDICINE | Age: 50
End: 2020-09-10

## 2020-09-10 NOTE — TELEPHONE ENCOUNTER
Pt called and states since this morning when he urinates there is blood in the urine. Quite a lot of blood.     Please advise on what patient should do.

## 2020-09-11 ENCOUNTER — OFFICE VISIT (OUTPATIENT)
Dept: INTERNAL MEDICINE | Age: 50
End: 2020-09-11

## 2020-09-11 VITALS
DIASTOLIC BLOOD PRESSURE: 70 MMHG | OXYGEN SATURATION: 99 % | HEIGHT: 69 IN | TEMPERATURE: 96.5 F | HEART RATE: 87 BPM | WEIGHT: 227 LBS | SYSTOLIC BLOOD PRESSURE: 132 MMHG | BODY MASS INDEX: 33.62 KG/M2

## 2020-09-11 DIAGNOSIS — R31.0 GROSS HEMATURIA: Primary | ICD-10-CM

## 2020-09-11 DIAGNOSIS — R97.20 INCREASED PROSTATE SPECIFIC ANTIGEN (PSA) VELOCITY: ICD-10-CM

## 2020-09-11 LAB
BILIRUB BLD-MCNC: NEGATIVE MG/DL
CLARITY, POC: ABNORMAL
COLOR UR: ABNORMAL
GLUCOSE UR STRIP-MCNC: NEGATIVE MG/DL
KETONES UR QL: NEGATIVE
LEUKOCYTE EST, POC: ABNORMAL
NITRITE UR-MCNC: NEGATIVE MG/ML
PH UR: 7.5 [PH] (ref 5–8)
PROT UR STRIP-MCNC: NEGATIVE MG/DL
RBC # UR STRIP: ABNORMAL /UL
SP GR UR: 1.02 (ref 1–1.03)
UROBILINOGEN UR QL: NORMAL

## 2020-09-11 PROCEDURE — 81003 URINALYSIS AUTO W/O SCOPE: CPT | Performed by: NURSE PRACTITIONER

## 2020-09-11 PROCEDURE — 99214 OFFICE O/P EST MOD 30 MIN: CPT | Performed by: NURSE PRACTITIONER

## 2020-09-11 RX ORDER — AMLODIPINE BESYLATE 5 MG/1
5 TABLET ORAL DAILY
COMMUNITY
Start: 2020-08-15 | End: 2021-12-09

## 2020-09-11 NOTE — PATIENT INSTRUCTIONS
Hematuria, Adult  Hematuria is blood in the urine. Blood may be visible in the urine, or it may be identified with a test. This condition can be caused by infections of the bladder, urethra, kidney, or prostate. Other possible causes include:  · Kidney stones.  · Cancer of the urinary tract.  · Too much calcium in the urine.  · Conditions that are passed from parent to child (inherited conditions).  · Exercise that requires a lot of energy.  Infections can usually be treated with medicine, and a kidney stone usually will pass through your urine. If neither of these is the cause of your hematuria, more tests may be needed to identify the cause of your symptoms.  It is very important to tell your health care provider about any blood in your urine, even if it is painless or the blood stops without treatment. Blood in the urine, when it happens and then stops and then happens again, can be a symptom of a very serious condition, including cancer. There is no pain in the initial stages of many urinary cancers.  Follow these instructions at home:  Medicines  · Take over-the-counter and prescription medicines only as told by your health care provider.  · If you were prescribed an antibiotic medicine, take it as told by your health care provider. Do not stop taking the antibiotic even if you start to feel better.  Eating and drinking  · Drink enough fluid to keep your urine clear or pale yellow. It is recommended that you drink 3-4 quarts (2.8-3.8 L) a day. If you have been diagnosed with an infection, it is recommended that you drink cranberry juice in addition to large amounts of water.  · Avoid caffeine, tea, and carbonated beverages. These tend to irritate the bladder.  · Avoid alcohol because it may irritate the prostate (men).  General instructions  · If you have been diagnosed with a kidney stone, follow your health care provider's instructions about straining your urine to catch the stone.  · Empty your bladder  often. Avoid holding urine for long periods of time.  · If you are female:  ? After a bowel movement, wipe from front to back and use each piece of toilet paper only once.  ? Empty your bladder before and after sex.  · Pay attention to any changes in your symptoms. Tell your health care provider about any changes or any new symptoms.  · It is your responsibility to get your test results. Ask your health care provider, or the department performing the test, when your results will be ready.  · Keep all follow-up visits as told by your health care provider. This is important.  Contact a health care provider if:  · You develop back pain.  · You have a fever.  · You have nausea or vomiting.  · Your symptoms do not improve after 3 days.  · Your symptoms get worse.  Get help right away if:  · You develop severe vomiting and are unable take medicine without vomiting.  · You develop severe pain in your back or abdomen even though you are taking medicine.  · You pass a large amount of blood in your urine.  · You pass blood clots in your urine.  · You feel very weak or like you might faint.  · You faint.  Summary  · Hematuria is blood in the urine. It has many possible causes.  · It is very important that you tell your health care provider about any blood in your urine, even if it is painless or the blood stops without treatment.  · Take over-the-counter and prescription medicines only as told by your health care provider.  · Drink enough fluid to keep your urine clear or pale yellow.  This information is not intended to replace advice given to you by your health care provider. Make sure you discuss any questions you have with your health care provider.  Document Released: 12/18/2006 Document Revised: 05/13/2020 Document Reviewed: 01/20/2018  Elsevier Patient Education © 2020 [employer]

## 2020-09-11 NOTE — PROGRESS NOTES
[facility]  Page Portillo / 49 y.o. / male  09/11/2020      ASSESSMENT & PLAN:    Problem List Items Addressed This Visit     None      Visit Diagnoses     Gross hematuria    -  Primary    Relevant Orders    POC Urinalysis Dipstick, Automated (Completed)    Urinalysis With Culture If Indicated - Urine, Clean Catch    CBC & Differential    Basic metabolic panel    Ambulatory Referral to Urology    CT Abdomen Pelvis Without Contrast    Increased prostate specific antigen (PSA) velocity        Relevant Orders    PSA DIAGNOSTIC ONLY        Orders Placed This Encounter   Procedures   • CT Abdomen Pelvis Without Contrast   • Urinalysis With Culture If Indicated - Urine, Clean Catch   • Basic metabolic panel   • PSA DIAGNOSTIC ONLY   • Ambulatory Referral to Urology   • POC Urinalysis Dipstick, Automated   • CBC & Differential     No orders of the defined types were placed in this encounter.      Summary/Discussion:    1. Gross hematuria  Ddx including: kidney stone, UTI, prostatitis, Prostate ca, bladder ca.   Send UA for reflex to culture.   Labs today, recheck PSA level.   CT next week, discussed ER if worsening symptoms over the weekend.   Referral to urology.     - POC Urinalysis Dipstick, Automated  - Urinalysis With Culture If Indicated - Urine, Clean Catch  - CBC & Differential  - Basic metabolic panel  - Ambulatory Referral to Urology  - CT Abdomen Pelvis Without Contrast    2. Increased prostate specific antigen (PSA) velocity    - PSA DIAGNOSTIC ONLY        No follow-ups on file.    ____________________________________________________________________    MEDICATIONS  Current Outpatient Medications   Medication Sig Dispense Refill   • DEXILANT 60 MG capsule TAKE 1 CAPSULE BY MOUTH EVERY DAY 90 capsule 2   • diclofenac (VOLTAREN) 1 % gel gel Apply 4 g topically to the appropriate area as directed 4 (Four) Times a Day As Needed (pain).     • levocetirizine (XYZAL) 5 MG tablet  "TAKE 1 TABLET BY MOUTH EVERY DAY 90 tablet 3   • mometasone (NASONEX) 50 MCG/ACT nasal spray ONE SPRAY AND EACH NOSTRIL TWICE DAILY 1 each 5   • montelukast (SINGULAIR) 10 MG tablet TAKE 1 TABLET BY MOUTH EVERY DAY AT NIGHT 90 tablet 2   • sildenafil (Viagra) 50 MG tablet Take 0.5 tablets by mouth Daily As Needed for Erectile Dysfunction. 30 tablet 1   • Turmeric Curcumin 500 MG capsule Take 1,000 mg by mouth Daily.     • valsartan (DIOVAN) 160 MG tablet Take 1 tablet by mouth Daily. 90 tablet 1   • amLODIPine (NORVASC) 5 MG tablet Take 5 mg by mouth Daily.       No current facility-administered medications for this visit.           VITALS:    Visit Vitals  /70   Pulse 87   Temp 96.5 °F (35.8 °C) (Temporal)   Ht 175.3 cm (69.02\")   Wt 103 kg (227 lb)   SpO2 99%   BMI 33.51 kg/m²       BP Readings from Last 3 Encounters:   09/11/20 132/70   07/13/20 132/80   03/09/20 140/100     Wt Readings from Last 3 Encounters:   09/11/20 103 kg (227 lb)   07/13/20 107 kg (235 lb)   03/09/20 105 kg (231 lb 12.8 oz)      Body mass index is 33.51 kg/m².    CC:  Main reason(s) for today's visit: Blood in Urine      HPI:     Hematuria: Patient complains of gross hematuria. Onset of hematuria was 1 day ago and was sudden in onset. There is not a history of nephrolithiasis. There is not a history of urologic trauma. Other urologic symptoms include hesitancy.  Patient denies history of previous infection, urolithiasis,  trauma,  cancer,  surgery and sexually transmitted diseases. Prior workup has been none.  Denies flank pain, groin pain, abdominal pain, testicular pain. No penile discharge. History of recent increased PSA velocity, has not yet rechecked. Denies any BPH symptoms.     Patient Care Team:  Page Buitrago APRN as PCP - General (Internal Medicine)    ____________________________________________________________________    REVIEW OF SYSTEMS    Review of Systems   Constitutional: Negative for fatigue, fever and " unexpected weight change.   Gastrointestinal: Negative for nausea and vomiting.   Genitourinary: Positive for hematuria. Negative for decreased urine volume, difficulty urinating, discharge, dysuria, enuresis, flank pain, frequency, genital sores, penile pain, penile swelling, scrotal swelling, testicular pain and urgency.   Musculoskeletal: Negative for back pain.         PHYSICAL EXAMINATION    Physical Exam   Constitutional: He is oriented to person, place, and time. Vital signs are normal. He appears well-developed and well-nourished. He is cooperative. He does not appear ill. No distress.   Cardiovascular: Normal rate, regular rhythm, S1 normal, S2 normal and normal heart sounds.   No murmur heard.  Pulmonary/Chest: Effort normal and breath sounds normal. He has no decreased breath sounds. He has no wheezes. He has no rhonchi. He has no rales.   Abdominal: There is no CVA tenderness.   Genitourinary:   Genitourinary Comments:  exam deferred by patient.    Neurological: He is alert and oriented to person, place, and time.   Skin: Skin is warm, dry and intact.   Psychiatric: He has a normal mood and affect. His speech is normal and behavior is normal. Judgment and thought content normal. Cognition and memory are normal.   Nursing note and vitals reviewed.      REVIEWED DATA:    Labs:     Lab Results   Component Value Date     07/13/2020    K 4.2 07/13/2020    AST 26 07/13/2020    ALT 29 07/13/2020    BUN 22 (H) 07/13/2020    CREATININE 1.27 07/13/2020    CREATININE 1.04 07/08/2019    CREATININE 1.21 04/06/2018    EGFRIFNONA 60 (L) 07/13/2020    EGFRIFAFRI 72 07/13/2020       Lab Results   Component Value Date    HGBA1C 5.10 07/13/2020    HGBA1C 5.03 04/06/2018       Lab Results   Component Value Date     (H) 07/13/2020     (H) 07/08/2019     (H) 04/06/2018    HDL 48 07/13/2020    HDL 54 07/08/2019    HDL 57 04/06/2018    TRIG 94 07/13/2020    TRIG 53 07/08/2019    TRIG 93 04/06/2018     CHOLHDLRATIO 4.35 07/13/2020    CHOLHDLRATIO 3.30 07/08/2019    CHOLHDLRATIO 3.98 04/06/2018       Lab Results   Component Value Date    TSH 3.220 07/08/2019       Lab Results   Component Value Date    WBC 4.71 07/08/2019    HGB 16.3 07/08/2019    HGB 16.0 04/06/2018     07/08/2019       Lab Results   Component Value Date    LEUKOCYTESUR Trace (A) 09/11/2020       Imaging:         Medical Tests:         Summary of old records / correspondence / consultant report:         Request outside records:         ALLERGIES  No Known Allergies     PFSH:     The following portions of the patient's history were reviewed and updated as appropriate: Allergies / Current Medications / Past Medical History / Surgical History / Social History / Family History    PROBLEM LIST   Patient Active Problem List   Diagnosis   • Cervical pain (neck)   • Espinal's esophagus without dysplasia   • Essential hypertension   • Impaired fasting glucose   • Encounter for screening for malignant neoplasm of colon       PAST MEDICAL HISTORY  Past Medical History:   Diagnosis Date   • Allergic    • Espinal esophagus    • Esophageal dilatation    • GERD (gastroesophageal reflux disease)    • Hypertension        SURGICAL HISTORY  Past Surgical History:   Procedure Laterality Date   • COLONOSCOPY  2013   • COLONOSCOPY N/A 1/17/2020    Procedure: COLONOSCOPY into cecum;  Surgeon: Isaac Vásquez MD;  Location: [facility];  Service: Gastroenterology   • ENDOSCOPY  2013   • ENDOSCOPY N/A 1/17/2020    Procedure: ESOPHAGOGASTRODUODENOSCOPY with biopsies;  Surgeon: Isaac Vásquez MD;  Location: [facility];  Service: Gastroenterology   • ENDOSCOPY AND COLONOSCOPY     • TEETH EXTRACTION Right 2020       SOCIAL HISTORY  Social History     Socioeconomic History   • Marital status: Single     Spouse name: Not on file   • Number of children: Not on file   • Years of education: Not on file   • Highest education level: Not on file   Tobacco Use    • Smoking status: Never Smoker   • Smokeless tobacco: Never Used   Substance and Sexual Activity   • Alcohol use: Yes     Alcohol/week: 2.0 standard drinks     Types: 2 Cans of beer per week     Comment: Occasional    • Drug use: No   • Sexual activity: Yes     Partners: Female     Birth control/protection: None       FAMILY HISTORY  Family History   Problem Relation Age of Onset   • Heart disease Mother    • Heart disease Father    • Other Sister    • Thyroid disease Sister          **Jessica Disclaimer:   Much of this encounter note is an electronic transcription/translation of spoken language to printed text. The electronic translation of spoken language may permit erroneous, or at times, nonsensical words or phrases to be inadvertently transcribed. Although I have reviewed the note for such errors, some may still exist.

## 2020-09-13 LAB
APPEARANCE UR: CLEAR
BACTERIA #/AREA URNS HPF: ABNORMAL /HPF
BACTERIA UR CULT: ABNORMAL
BACTERIA UR CULT: ABNORMAL
BASOPHILS # BLD AUTO: 0.09 10*3/MM3 (ref 0–0.2)
BASOPHILS NFR BLD AUTO: 1.8 % (ref 0–1.5)
BILIRUB UR QL STRIP: NEGATIVE
BUN SERPL-MCNC: 17 MG/DL (ref 6–20)
BUN/CREAT SERPL: 14.2 (ref 7–25)
CALCIUM SERPL-MCNC: 9.5 MG/DL (ref 8.6–10.5)
CHLORIDE SERPL-SCNC: 102 MMOL/L (ref 98–107)
CO2 SERPL-SCNC: 32.1 MMOL/L (ref 22–29)
COLOR UR: YELLOW
CREAT SERPL-MCNC: 1.2 MG/DL (ref 0.76–1.27)
EOSINOPHIL # BLD AUTO: 0.17 10*3/MM3 (ref 0–0.4)
EOSINOPHIL NFR BLD AUTO: 3.5 % (ref 0.3–6.2)
EPI CELLS #/AREA URNS HPF: ABNORMAL /HPF (ref 0–10)
ERYTHROCYTE [DISTWIDTH] IN BLOOD BY AUTOMATED COUNT: 13.5 % (ref 12.3–15.4)
GLUCOSE SERPL-MCNC: 79 MG/DL (ref 65–99)
GLUCOSE UR QL: NEGATIVE
HCT VFR BLD AUTO: 45.8 % (ref 37.5–51)
HGB BLD-MCNC: 15.2 G/DL (ref 13–17.7)
HGB UR QL STRIP: ABNORMAL
IMM GRANULOCYTES # BLD AUTO: 0.01 10*3/MM3 (ref 0–0.05)
IMM GRANULOCYTES NFR BLD AUTO: 0.2 % (ref 0–0.5)
KETONES UR QL STRIP: NEGATIVE
LEUKOCYTE ESTERASE UR QL STRIP: ABNORMAL
LYMPHOCYTES # BLD AUTO: 1.23 10*3/MM3 (ref 0.7–3.1)
LYMPHOCYTES NFR BLD AUTO: 25.1 % (ref 19.6–45.3)
MCH RBC QN AUTO: 29.6 PG (ref 26.6–33)
MCHC RBC AUTO-ENTMCNC: 33.2 G/DL (ref 31.5–35.7)
MCV RBC AUTO: 89.1 FL (ref 79–97)
MICRO URNS: ABNORMAL
MONOCYTES # BLD AUTO: 0.34 10*3/MM3 (ref 0.1–0.9)
MONOCYTES NFR BLD AUTO: 6.9 % (ref 5–12)
MUCOUS THREADS URNS QL MICRO: PRESENT /HPF
NEUTROPHILS # BLD AUTO: 3.06 10*3/MM3 (ref 1.7–7)
NEUTROPHILS NFR BLD AUTO: 62.5 % (ref 42.7–76)
NITRITE UR QL STRIP: NEGATIVE
NRBC BLD AUTO-RTO: 0 /100 WBC (ref 0–0.2)
PH UR STRIP: 8 [PH] (ref 5–7.5)
PLATELET # BLD AUTO: 212 10*3/MM3 (ref 140–450)
POTASSIUM SERPL-SCNC: 4.4 MMOL/L (ref 3.5–5.2)
PROT UR QL STRIP: NEGATIVE
PSA SERPL-MCNC: 0.94 NG/ML (ref 0–4)
RBC # BLD AUTO: 5.14 10*6/MM3 (ref 4.14–5.8)
RBC #/AREA URNS HPF: ABNORMAL /HPF (ref 0–2)
SODIUM SERPL-SCNC: 140 MMOL/L (ref 136–145)
SP GR UR: 1.02 (ref 1–1.03)
URINALYSIS REFLEX: ABNORMAL
UROBILINOGEN UR STRIP-MCNC: 1 MG/DL (ref 0.2–1)
WBC # BLD AUTO: 4.9 10*3/MM3 (ref 3.4–10.8)
WBC #/AREA URNS HPF: ABNORMAL /HPF (ref 0–5)

## 2020-09-14 DIAGNOSIS — R82.71 GROUP B STREPTOCOCCAL BACTERIURIA: Primary | ICD-10-CM

## 2020-09-14 RX ORDER — AMOXICILLIN 875 MG/1
875 TABLET, COATED ORAL 2 TIMES DAILY
Qty: 20 TABLET | Refills: 0 | Status: SHIPPED | OUTPATIENT
Start: 2020-09-14 | End: 2020-09-24

## 2020-09-15 ENCOUNTER — HOSPITAL ENCOUNTER (OUTPATIENT)
Dept: CT IMAGING | Facility: HOSPITAL | Age: 50
Discharge: HOME | End: 2020-09-15
Admitting: NURSE PRACTITIONER

## 2020-09-15 PROCEDURE — 74176 CT ABD & PELVIS W/O CONTRAST: CPT

## 2020-09-22 ENCOUNTER — APPOINTMENT (OUTPATIENT)
Dept: CT IMAGING | Facility: HOSPITAL | Age: 50
End: 2020-09-22

## 2020-11-04 DIAGNOSIS — N52.9 ERECTILE DYSFUNCTION, UNSPECIFIED ERECTILE DYSFUNCTION TYPE: ICD-10-CM

## 2020-11-04 DIAGNOSIS — I10 ESSENTIAL HYPERTENSION: ICD-10-CM

## 2020-11-04 RX ORDER — VALSARTAN 160 MG/1
TABLET ORAL
Qty: 90 TABLET | Refills: 3 | Status: SHIPPED | OUTPATIENT
Start: 2020-11-04 | End: 2021-03-22

## 2020-11-04 RX ORDER — SILDENAFIL 50 MG/1
25 TABLET, FILM COATED ORAL DAILY PRN
Qty: 30 TABLET | Refills: 1 | Status: SHIPPED | OUTPATIENT
Start: 2020-11-04 | End: 2023-04-04 | Stop reason: SDUPTHER

## 2020-11-10 RX ORDER — AMLODIPINE BESYLATE 5 MG/1
TABLET ORAL
Qty: 90 TABLET | Refills: 3 | OUTPATIENT
Start: 2020-11-10

## 2021-02-15 RX ORDER — DEXLANSOPRAZOLE 60 MG/1
CAPSULE, DELAYED RELEASE ORAL
Qty: 90 CAPSULE | Refills: 2 | Status: SHIPPED | OUTPATIENT
Start: 2021-02-15 | End: 2021-11-15

## 2021-03-22 ENCOUNTER — OFFICE VISIT (OUTPATIENT)
Dept: INTERNAL MEDICINE | Age: 51
End: 2021-03-22

## 2021-03-22 ENCOUNTER — HOSPITAL ENCOUNTER (OUTPATIENT)
Dept: GENERAL RADIOLOGY | Facility: HOSPITAL | Age: 51
Discharge: HOME | End: 2021-03-22
Admitting: NURSE PRACTITIONER

## 2021-03-22 VITALS
TEMPERATURE: 97.8 F | BODY MASS INDEX: 35.7 KG/M2 | WEIGHT: 241 LBS | SYSTOLIC BLOOD PRESSURE: 160 MMHG | HEART RATE: 98 BPM | HEIGHT: 69 IN | OXYGEN SATURATION: 98 % | DIASTOLIC BLOOD PRESSURE: 98 MMHG

## 2021-03-22 DIAGNOSIS — G25.81 RESTLESS LEG SYNDROME: ICD-10-CM

## 2021-03-22 DIAGNOSIS — R05.3 PERSISTENT COUGH: ICD-10-CM

## 2021-03-22 DIAGNOSIS — I10 ESSENTIAL HYPERTENSION: Primary | ICD-10-CM

## 2021-03-22 DIAGNOSIS — R05.8 ALLERGIC COUGH: ICD-10-CM

## 2021-03-22 DIAGNOSIS — J45.909 BRONCHITIS, ALLERGIC, UNSPECIFIED ASTHMA SEVERITY, UNCOMPLICATED: ICD-10-CM

## 2021-03-22 DIAGNOSIS — Z86.16 HISTORY OF 2019 NOVEL CORONAVIRUS DISEASE (COVID-19): ICD-10-CM

## 2021-03-22 LAB
ALBUMIN SERPL-MCNC: 4.5 G/DL (ref 3.5–5.2)
ALBUMIN/GLOB SERPL: 1.9 G/DL
ALP SERPL-CCNC: 54 U/L (ref 39–117)
ALT SERPL-CCNC: 27 U/L (ref 1–41)
AST SERPL-CCNC: 26 U/L (ref 1–40)
BILIRUB SERPL-MCNC: 0.5 MG/DL (ref 0–1.2)
BUN SERPL-MCNC: 19 MG/DL (ref 6–20)
BUN/CREAT SERPL: 18.8 (ref 7–25)
CALCIUM SERPL-MCNC: 10 MG/DL (ref 8.6–10.5)
CHLORIDE SERPL-SCNC: 101 MMOL/L (ref 98–107)
CO2 SERPL-SCNC: 31 MMOL/L (ref 22–29)
CREAT SERPL-MCNC: 1.01 MG/DL (ref 0.76–1.27)
FERRITIN SERPL-MCNC: 40.6 NG/ML (ref 30–400)
GLOBULIN SER CALC-MCNC: 2.4 GM/DL
GLUCOSE SERPL-MCNC: 86 MG/DL (ref 65–99)
POTASSIUM SERPL-SCNC: 4.1 MMOL/L (ref 3.5–5.2)
PROT SERPL-MCNC: 6.9 G/DL (ref 6–8.5)
SODIUM SERPL-SCNC: 140 MMOL/L (ref 136–145)
TSH SERPL DL<=0.005 MIU/L-ACNC: 3.57 UIU/ML (ref 0.27–4.2)

## 2021-03-22 PROCEDURE — 99214 OFFICE O/P EST MOD 30 MIN: CPT | Performed by: NURSE PRACTITIONER

## 2021-03-22 PROCEDURE — 71046 X-RAY EXAM CHEST 2 VIEWS: CPT

## 2021-03-22 RX ORDER — MONTELUKAST SODIUM 10 MG/1
10 TABLET ORAL
Qty: 90 TABLET | Refills: 1 | Status: SHIPPED | OUTPATIENT
Start: 2021-03-22 | End: 2021-05-03 | Stop reason: SDUPTHER

## 2021-03-22 RX ORDER — ALBUTEROL SULFATE 90 UG/1
1-2 AEROSOL, METERED RESPIRATORY (INHALATION) EVERY 4 HOURS PRN
Qty: 18 G | Refills: 1 | Status: SHIPPED | OUTPATIENT
Start: 2021-03-22 | End: 2022-06-23 | Stop reason: SDUPTHER

## 2021-03-22 RX ORDER — VALSARTAN 320 MG/1
320 TABLET ORAL DAILY
Qty: 90 TABLET | Refills: 1 | Status: SHIPPED | OUTPATIENT
Start: 2021-03-22 | End: 2021-05-03 | Stop reason: SDUPTHER

## 2021-03-22 NOTE — PROGRESS NOTES
"Chief Complaint  Hypertension, Cough (intermittent, chronic), and Restless Legs Syndrome (x years )    Subjective          Ariel Portillo presents to [facility]  Cough  This is a recurrent problem. The current episode started more than 1 month ago. The problem occurs constantly. Cough characteristics: Dry, \"tickling\" Pertinent negatives include no chest pain, shortness of breath, sweats or wheezing. The symptoms are aggravated by pollens. Treatments tried: antihistamine, NCS spray. His past medical history is significant for environmental allergies. There is no history of asthma or bronchitis. Reports similar cough every year. He did have COVID back in December, wanting CXR.    Hypertension  This is a chronic problem. The problem is uncontrolled (Not checking BP at home ). Pertinent negatives include no chest pain, shortness of breath or sweats. Current antihypertension treatment includes calcium channel blockers and angiotensin blockers. Compliance problems include diet.    Had a preworkout drink approximately 5 hours ago.     Thinks he may have restless leg syndrome.  This is been going on for a number of years, his significant other has recommended he get this checked out.  He reports urges to move legs, inabilities to sit still while sleeping or at rest. Sometimes tingling in extremities.     Objective   Vital Signs:   /98   Pulse 98   Temp 97.8 °F (36.6 °C) (Temporal)   Ht 175.3 cm (69.02\")   Wt 109 kg (241 lb)   SpO2 98%   BMI 35.57 kg/m²     Physical Exam  Vitals and nursing note reviewed.   Constitutional:       General: He is not in acute distress.     Appearance: He is well-developed. He is not ill-appearing.   Cardiovascular:      Rate and Rhythm: Normal rate and regular rhythm.      Heart sounds: Normal heart sounds, S1 normal and S2 normal. No murmur heard.     Pulmonary:      Effort: Pulmonary effort is normal.      Breath sounds: Normal breath sounds. No " decreased breath sounds, wheezing, rhonchi or rales.   Skin:     General: Skin is warm and dry.   Neurological:      Mental Status: He is alert and oriented to person, place, and time.   Psychiatric:         Speech: Speech normal.         Behavior: Behavior normal. Behavior is cooperative.         Thought Content: Thought content normal.         Judgment: Judgment normal.        Result Review :   The following data was reviewed by: GLENN Carlos on 03/22/2021:  Common labs    Common Labsle 7/13/20 7/13/20 7/13/20 7/13/20 9/11/20 9/11/20 9/11/20    0853 0853 0853 0853 1227 1227 1227   Glucose 97     79    BUN 22 (A)     17    Creatinine 1.27     1.20    eGFR Non African Am 60 (A)     64    eGFR  Am 72     77    Sodium 140     140    Potassium 4.2     4.4    Chloride 102     102    Calcium 9.5     9.5    Total Protein 6.9         Albumin 4.60         Total Bilirubin 0.8         Alkaline Phosphatase 63         AST (SGOT) 26         ALT (SGPT) 29         WBC     4.90     Hemoglobin     15.2     Hematocrit     45.8     Platelets     212     Total Cholesterol   209 (A)       Triglycerides   94       HDL Cholesterol   48       LDL Cholesterol    142 (A)       Hemoglobin A1C  5.10        PSA    1.170   0.937   (A) Abnormal value       Comments are available for some flowsheets but are not being displayed.                  Assessment and Plan    Diagnoses and all orders for this visit:    1. Essential hypertension (Primary)  BP suboptimally controlled. Recommend increase valsartan to 320mg daily.   Needs to start monitoring BP at home.   Follow up BP recheck 6 weeks.     -     TSH Rfx On Abnormal To Free T4  -     valsartan (DIOVAN) 320 MG tablet; Take 1 tablet by mouth Daily.  Dispense: 90 tablet; Refill: 1    2. Persistent cough  Suspect allergic bronchitis, or allergy induced asthma.  Refill Singulair today.  Patient seems to have worsening symptoms since Covid infection last year, will obtain x-ray today.   Advised patient can use albuterol as needed.  If this does not seem to help, may want to consider short-term use of inhaled corticosteroid especially during allergy season.    -     XR Chest PA & Lateral  -     albuterol sulfate  (90 Base) MCG/ACT inhaler; Inhale 1-2 puffs Every 4 (Four) Hours As Needed for Wheezing or Shortness of Air.  Dispense: 18 g; Refill: 1    3. History of 2019 novel coronavirus disease (COVID-19)  Comments:  December 2020  Orders:  -     XR Chest PA & Lateral    4. Allergic cough  -     montelukast (SINGULAIR) 10 MG tablet; Take 1 tablet by mouth every night at bedtime.  Dispense: 90 tablet; Refill: 1    5. Restless leg syndrome  Check labs and iron level today.  If labs within normal limits, advised patient can start low-dose pramipexole therapy.     -     Comprehensive Metabolic Panel  -     TSH Rfx On Abnormal To Free T4  -     Ferritin    6. Bronchitis, allergic, unspecified asthma severity, uncomplicated  -     XR Chest PA & Lateral  -     montelukast (SINGULAIR) 10 MG tablet; Take 1 tablet by mouth every night at bedtime.  Dispense: 90 tablet; Refill: 1  -     albuterol sulfate  (90 Base) MCG/ACT inhaler; Inhale 1-2 puffs Every 4 (Four) Hours As Needed for Wheezing or Shortness of Air.  Dispense: 18 g; Refill: 1      Follow Up   Return in about 6 weeks (around 5/3/2021) for Next scheduled follow up.  Patient was given instructions and counseling regarding his condition or for health maintenance advice. Please see specific information pulled into the AVS if appropriate.

## 2021-03-23 DIAGNOSIS — R79.0 DECREASED FERRITIN: ICD-10-CM

## 2021-03-23 DIAGNOSIS — G25.81 RESTLESS LEG SYNDROME: Primary | ICD-10-CM

## 2021-03-23 RX ORDER — FERROUS SULFATE 325(65) MG
325 TABLET ORAL
Qty: 90 TABLET | Refills: 0 | Status: SHIPPED | OUTPATIENT
Start: 2021-03-23 | End: 2021-11-01

## 2021-03-24 ENCOUNTER — BULK ORDERING (OUTPATIENT)
Dept: CASE MANAGEMENT | Facility: OTHER | Age: 51
End: 2021-03-24

## 2021-03-24 DIAGNOSIS — Z23 IMMUNIZATION DUE: ICD-10-CM

## 2021-05-03 ENCOUNTER — OFFICE VISIT (OUTPATIENT)
Dept: INTERNAL MEDICINE | Age: 51
End: 2021-05-03

## 2021-05-03 VITALS
OXYGEN SATURATION: 98 % | HEART RATE: 87 BPM | BODY MASS INDEX: 34.21 KG/M2 | HEIGHT: 69 IN | TEMPERATURE: 97.5 F | DIASTOLIC BLOOD PRESSURE: 92 MMHG | WEIGHT: 231 LBS | SYSTOLIC BLOOD PRESSURE: 140 MMHG

## 2021-05-03 DIAGNOSIS — R73.01 IMPAIRED FASTING GLUCOSE: ICD-10-CM

## 2021-05-03 DIAGNOSIS — R05.8 ALLERGIC COUGH: ICD-10-CM

## 2021-05-03 DIAGNOSIS — J45.909 BRONCHITIS, ALLERGIC, UNSPECIFIED ASTHMA SEVERITY, UNCOMPLICATED: ICD-10-CM

## 2021-05-03 DIAGNOSIS — R79.0 DECREASED FERRITIN: ICD-10-CM

## 2021-05-03 DIAGNOSIS — I10 ESSENTIAL HYPERTENSION: Primary | ICD-10-CM

## 2021-05-03 DIAGNOSIS — R05.3 PERSISTENT COUGH: ICD-10-CM

## 2021-05-03 DIAGNOSIS — K21.9 GASTROESOPHAGEAL REFLUX DISEASE, UNSPECIFIED WHETHER ESOPHAGITIS PRESENT: ICD-10-CM

## 2021-05-03 DIAGNOSIS — G25.81 RESTLESS LEG SYNDROME: ICD-10-CM

## 2021-05-03 LAB — FERRITIN SERPL-MCNC: 67.3 NG/ML (ref 30–400)

## 2021-05-03 PROCEDURE — 99214 OFFICE O/P EST MOD 30 MIN: CPT | Performed by: NURSE PRACTITIONER

## 2021-05-03 RX ORDER — VALSARTAN 320 MG/1
320 TABLET ORAL DAILY
Qty: 90 TABLET | Refills: 1 | Status: SHIPPED | OUTPATIENT
Start: 2021-05-03 | End: 2021-11-03

## 2021-05-03 RX ORDER — MONTELUKAST SODIUM 10 MG/1
10 TABLET ORAL
Qty: 90 TABLET | Refills: 1 | Status: SHIPPED | OUTPATIENT
Start: 2021-05-03 | End: 2021-11-03

## 2021-05-03 RX ORDER — FLUTICASONE PROPIONATE 44 MCG
2 AEROSOL WITH ADAPTER (GRAM) INHALATION
Qty: 1 EACH | Refills: 11 | Status: CANCELLED | OUTPATIENT
Start: 2021-05-03

## 2021-05-03 NOTE — PROGRESS NOTES
"Chief Complaint  Hypertension, Cough, and Restless Legs Syndrome    Subjective          Ariel Portillo presents to [facility]  Hypertension  This is a chronic problem. The problem is uncontrolled (Has not been checking at home). Pertinent negatives include no anxiety, blurred vision, chest pain, headaches, peripheral edema, PND, shortness of breath or sweats. Current antihypertension treatment includes angiotensin blockers and calcium channel blockers. Compliance problems include diet.    Cough  This is a chronic problem. The problem has been unchanged. Pertinent negatives include no chest pain, headaches, shortness of breath or sweats. Exacerbated by: eating, talking. He has tried a beta-agonist inhaler and leukotriene antagonists for the symptoms. The treatment provided no relief. His past medical history is significant for environmental allergies. There is no history of asthma. GERD (patient states I thinks my cough might be related to this, also having sore throat)    Last EGD 1/2020 was WNL.     He has been taking ferritin supplementation for approximately 6 weeks due to low iron levels and restless leg syndrome.  He has not noticed any improvement in symptoms since starting the iron supplement.     Objective   Vital Signs:   /92   Pulse 87   Temp 97.5 °F (36.4 °C) (Temporal)   Ht 175.3 cm (69.02\")   Wt 105 kg (231 lb)   SpO2 98%   BMI 34.10 kg/m²     Physical Exam  Vitals and nursing note reviewed.   Constitutional:       General: He is not in acute distress.     Appearance: He is well-developed. He is not ill-appearing.   Cardiovascular:      Rate and Rhythm: Normal rate and regular rhythm.      Heart sounds: Normal heart sounds, S1 normal and S2 normal. No murmur heard.     Pulmonary:      Effort: Pulmonary effort is normal.      Breath sounds: Normal breath sounds. No decreased breath sounds, wheezing, rhonchi or rales.   Skin:     General: Skin is warm and dry. "   Neurological:      Mental Status: He is alert and oriented to person, place, and time.   Psychiatric:         Speech: Speech normal.         Behavior: Behavior normal. Behavior is cooperative.         Thought Content: Thought content normal.         Judgment: Judgment normal.        Result Review :   The following data was reviewed by: GLENN Carlos on 05/03/2021:  Common labs    Common Labsle 7/13/20 7/13/20 7/13/20 7/13/20 9/11/20 9/11/20 9/11/20 3/22/21    0853 0853 0853 0853 1227 1227 1227    Glucose 97     79  86   BUN 22 (A)     17  19   Creatinine 1.27     1.20  1.01   eGFR Non African Am 60 (A)     64  78   eGFR  Am 72     77  94   Sodium 140     140  140   Potassium 4.2     4.4  4.1   Chloride 102     102  101   Calcium 9.5     9.5  10.0   Total Protein 6.9       6.9   Albumin 4.60       4.50   Total Bilirubin 0.8       0.5   Alkaline Phosphatase 63       54   AST (SGOT) 26       26   ALT (SGPT) 29       27   WBC     4.90      Hemoglobin     15.2      Hematocrit     45.8      Platelets     212      Total Cholesterol   209 (A)        Triglycerides   94        HDL Cholesterol   48        LDL Cholesterol    142 (A)        Hemoglobin A1C  5.10         PSA    1.170   0.937    (A) Abnormal value       Comments are available for some flowsheets but are not being displayed.                    Assessment and Plan    Diagnoses and all orders for this visit:    1. Essential hypertension (Primary)  Blood pressure high in office today.  Patient has not been monitoring at home as we previously discussed.  Advised him to start monitoring at home, notify me if consistently higher than 130/80 outside of the office setting.  Continue current treatment for now.  Continue lifestyle modifications for high blood pressure, high cholesterol, and increased weight. Decrease/eliminate soda, caffeine, alcohol and overall caloric intake. Reduce carbohydrates and sweets in diet.  Continue to improve dietary habits with  lean proteins, fresh vegetables, fruits, and nuts. Improve aerobic exercise: walking/biking/swimming daily as tolerated, recommend 30 minutes/day at least 5 days/week.       -     valsartan (DIOVAN) 320 MG tablet; Take 1 tablet by mouth Daily.  Dispense: 90 tablet; Refill: 1    2. Allergic cough  -     montelukast (SINGULAIR) 10 MG tablet; Take 1 tablet by mouth every night at bedtime.  Dispense: 90 tablet; Refill: 1    3. Bronchitis, allergic, unspecified asthma severity, uncomplicated  -     montelukast (SINGULAIR) 10 MG tablet; Take 1 tablet by mouth every night at bedtime.  Dispense: 90 tablet; Refill: 1    4. Decreased ferritin  Recheck iron levels today.  If greater than 75, will recommend start low-dose medication for restless legs, such as pramipexole or ropinirole.  Has not had any improvement up to this point with increasing iron intake.    -     Ferritin    5. Restless leg syndrome  -     Ferritin    6. Impaired fasting glucose    7. Persistent cough  Suspect GERD related, has not responded to treatment for allergies/ asthma.   Continue current PPI for now.     -     Ambulatory Referral to Gastroenterology    8. Gastroesophageal reflux disease, unspecified whether esophagitis present  -     Ambulatory Referral to Gastroenterology        Follow Up   Return in about 4 months (around 9/3/2021) for Next scheduled follow up with fasting labs same day .  Patient was given instructions and counseling regarding his condition or for health maintenance advice. Please see specific information pulled into the AVS if appropriate.

## 2021-05-04 ENCOUNTER — TELEPHONE (OUTPATIENT)
Dept: INTERNAL MEDICINE | Age: 51
End: 2021-05-04

## 2021-05-26 DIAGNOSIS — J30.9 ALLERGIC RHINITIS, UNSPECIFIED SEASONALITY, UNSPECIFIED TRIGGER: ICD-10-CM

## 2021-05-26 RX ORDER — MOMETASONE FUROATE 50 UG/1
SPRAY, METERED NASAL
Qty: 17 G | Refills: 5 | Status: SHIPPED | OUTPATIENT
Start: 2021-05-26 | End: 2023-04-04 | Stop reason: SDUPTHER

## 2021-06-07 ENCOUNTER — OFFICE VISIT (OUTPATIENT)
Dept: GASTROENTEROLOGY | Facility: CLINIC | Age: 51
End: 2021-06-07

## 2021-06-07 VITALS — WEIGHT: 235 LBS | BODY MASS INDEX: 34.8 KG/M2 | HEIGHT: 69 IN | TEMPERATURE: 97.5 F

## 2021-06-07 DIAGNOSIS — R12 HEARTBURN: Primary | ICD-10-CM

## 2021-06-07 PROCEDURE — 99215 OFFICE O/P EST HI 40 MIN: CPT | Performed by: INTERNAL MEDICINE

## 2021-06-07 NOTE — PROGRESS NOTES
Chief Complaint   Patient presents with   • Heartburn     Subjective     HPI  Ariel Portillo is a 50 y.o. male who presents today for new office visit  50-year-old gentleman with chronic heartburn which is actually well controlled with Dexilant  He had an EGD 1 year ago showing a small hiatal hernia and a benign gastric polyp  While the heartburn is well controlled with Dexilant he still has issues with chronic intermittent cough worse in the winter better in the summer.  There is a sore throat he deals with quite often.  He is not sure whether it seasonal allergies, an ENT issue such as postnasal drip or sinusitis, or he is worried it is GERD  No dysphagia  Normal colonoscopy 1 year ago  No abdominal pain  No vomiting    Objective   Vitals:    06/07/21 1412   Temp: 97.5 °F (36.4 °C)       Physical Exam  Vitals reviewed.   Constitutional:       Appearance: He is well-developed.   HENT:      Head: Normocephalic and atraumatic.   Abdominal:      General: Bowel sounds are normal. There is no distension.      Palpations: Abdomen is soft. There is no mass.      Tenderness: There is no abdominal tenderness.      Hernia: No hernia is present.   Skin:     General: Skin is warm and dry.   Neurological:      Mental Status: He is alert and oriented to person, place, and time.   Psychiatric:         Behavior: Behavior normal.         Thought Content: Thought content normal.         Judgment: Judgment normal.       The following data was reviewed by: Alfonso Kapoor MD on 06/07/2021:  Common labs    Common Labsle 7/13/20 7/13/20 7/13/20 7/13/20 9/11/20 9/11/20 9/11/20 3/22/21    0853 0853 0853 0853 1227 1227 1227    Glucose 97     79  86   BUN 22 (A)     17  19   Creatinine 1.27     1.20  1.01   eGFR Non African Am 60 (A)     64  78   eGFR  Am 72     77  94   Sodium 140     140  140   Potassium 4.2     4.4  4.1   Chloride 102     102  101   Calcium 9.5     9.5  10.0   Total Protein 6.9       6.9   Albumin 4.60        4.50   Total Bilirubin 0.8       0.5   Alkaline Phosphatase 63       54   AST (SGOT) 26       26   ALT (SGPT) 29       27   WBC     4.90      Hemoglobin     15.2      Hematocrit     45.8      Platelets     212      Total Cholesterol   209 (A)        Triglycerides   94        HDL Cholesterol   48        LDL Cholesterol    142 (A)        Hemoglobin A1C  5.10         PSA    1.170   0.937    (A) Abnormal value       Comments are available for some flowsheets but are not being displayed.           CMP    CMP 7/13/20 9/11/20 3/22/21   Glucose 97 79 86   BUN 22 (A) 17 19   Creatinine 1.27 1.20 1.01   eGFR Non African Am 60 (A) 64 78   eGFR  Am 72 77 94   Sodium 140 140 140   Potassium 4.2 4.4 4.1   Chloride 102 102 101   Calcium 9.5 9.5 10.0   Total Protein 6.9  6.9   Albumin 4.60  4.50   Globulin 2.3  2.4   Total Bilirubin 0.8  0.5   Alkaline Phosphatase 63  54   AST (SGOT) 26  26   ALT (SGPT) 29  27   (A) Abnormal value       Comments are available for some flowsheets but are not being displayed.           CBC    CBC 9/11/20   WBC 4.90   RBC 5.14   Hemoglobin 15.2   Hematocrit 45.8   MCV 89.1   MCH 29.6   MCHC 33.2   RDW 13.5   Platelets 212           CBC w/diff    CBC w/Diff 9/11/20   WBC 4.90   RBC 5.14   Hemoglobin 15.2   Hematocrit 45.8   MCV 89.1   MCH 29.6   MCHC 33.2   RDW 13.5   Platelets 212   Neutrophil Rel % 62.5   Lymphocyte Rel % 25.1   Monocyte Rel % 6.9   Eosinophil Rel % 3.5   Basophil Rel % 1.8 (A)   (A) Abnormal value            Data reviewed: GI studies I reviewed Dr. Vásquez's notes last year for EGD and colonoscopy and biopsy results   EGD with benign gastric polyp and a small hiatal hernia  Colonoscopy was normal    Assessment/Plan   Assessment:     Heartburn well controlled with Dexilant  Patient is left with cough and sore throat, he has tried allergy medications without relief    Plan:     I will schedule EGD with pH testing, will do the procedure on Dexilant  If his acid reflux is well  controlled with dexilant  on this pH test and his cough has no association with acid reflux on the monitor then I would recommend a referral to an allergist/ENT physician such as [facility] so that they can perform perform laryngoscopy for his chronic sore throat and cough and also treat any primary ENT issue or seasonal allergy that needs treatment  He tells me he saw a pulmonologist who ruled out asthma  GERD lifestyle modifications discussed in great detail    I spent 45 minutes caring for Ariel on this date of service. This time includes time spent by me in the following activities:preparing for the visit, reviewing tests, obtaining and/or reviewing a separately obtained history, performing a medically appropriate examination and/or evaluation , counseling and educating the patient/family/caregiver, ordering medications, tests, or procedures, referring and communicating with other health care professionals , documenting information in the medical record, independently interpreting results and communicating that information with the patient/family/caregiver and care coordination    Alfonso Kapoor MD  [facility]  [address] [place] [address]  Office: [phone]

## 2021-09-16 RX ORDER — LEVOCETIRIZINE DIHYDROCHLORIDE 5 MG/1
TABLET, FILM COATED ORAL
Qty: 90 TABLET | Refills: 3 | Status: SHIPPED | OUTPATIENT
Start: 2021-09-16 | End: 2023-04-04 | Stop reason: SDUPTHER

## 2021-11-01 ENCOUNTER — OFFICE VISIT (OUTPATIENT)
Dept: INTERNAL MEDICINE | Age: 51
End: 2021-11-01

## 2021-11-01 VITALS
HEART RATE: 96 BPM | OXYGEN SATURATION: 98 % | TEMPERATURE: 97.5 F | BODY MASS INDEX: 33.33 KG/M2 | DIASTOLIC BLOOD PRESSURE: 76 MMHG | SYSTOLIC BLOOD PRESSURE: 140 MMHG | WEIGHT: 225 LBS | HEIGHT: 69 IN

## 2021-11-01 DIAGNOSIS — Z23 NEED FOR SHINGLES VACCINE: ICD-10-CM

## 2021-11-01 DIAGNOSIS — Z23 FLU VACCINE NEED: ICD-10-CM

## 2021-11-01 DIAGNOSIS — Z13.220 SCREENING FOR LIPID DISORDERS: ICD-10-CM

## 2021-11-01 DIAGNOSIS — R79.0 DECREASED FERRITIN: ICD-10-CM

## 2021-11-01 DIAGNOSIS — Z12.5 ENCOUNTER FOR PROSTATE CANCER SCREENING: ICD-10-CM

## 2021-11-01 DIAGNOSIS — G25.81 RESTLESS LEG SYNDROME: ICD-10-CM

## 2021-11-01 DIAGNOSIS — R73.01 IMPAIRED FASTING GLUCOSE: ICD-10-CM

## 2021-11-01 DIAGNOSIS — I10 ESSENTIAL HYPERTENSION: Primary | ICD-10-CM

## 2021-11-01 DIAGNOSIS — K22.70 BARRETT'S ESOPHAGUS WITHOUT DYSPLASIA: ICD-10-CM

## 2021-11-01 PROCEDURE — 90472 IMMUNIZATION ADMIN EACH ADD: CPT | Performed by: NURSE PRACTITIONER

## 2021-11-01 PROCEDURE — 99214 OFFICE O/P EST MOD 30 MIN: CPT | Performed by: NURSE PRACTITIONER

## 2021-11-01 PROCEDURE — 90686 IIV4 VACC NO PRSV 0.5 ML IM: CPT | Performed by: NURSE PRACTITIONER

## 2021-11-01 PROCEDURE — 90750 HZV VACC RECOMBINANT IM: CPT | Performed by: NURSE PRACTITIONER

## 2021-11-01 PROCEDURE — 90471 IMMUNIZATION ADMIN: CPT | Performed by: NURSE PRACTITIONER

## 2021-11-01 RX ORDER — HYDROCHLOROTHIAZIDE 12.5 MG/1
12.5 TABLET ORAL DAILY
Qty: 90 TABLET | Refills: 1 | Status: SHIPPED | OUTPATIENT
Start: 2021-11-01 | End: 2022-06-23

## 2021-11-01 NOTE — PATIENT INSTRUCTIONS
"Hypertension, Adult    High blood pressure (hypertension) is when the force of blood pumping through the arteries is too strong. The arteries are the blood vessels that carry blood from the heart throughout the body. Hypertension forces the heart to work harder to pump blood and may cause arteries to become narrow or stiff. Untreated or uncontrolled hypertension can cause a heart attack, heart failure, a stroke, kidney disease, and other problems.  A blood pressure reading consists of a higher number over a lower number. Ideally, your blood pressure should be below 120/80. The first (\"top\") number is called the systolic pressure. It is a measure of the pressure in your arteries as your heart beats. The second (\"bottom\") number is called the diastolic pressure. It is a measure of the pressure in your arteries as the heart relaxes.  What are the causes?  The exact cause of this condition is not known. There are some conditions that result in or are related to high blood pressure.  What increases the risk?  Some risk factors for high blood pressure are under your control. The following factors may make you more likely to develop this condition:  · Smoking.  · Having type 2 diabetes mellitus, high cholesterol, or both.  · Not getting enough exercise or physical activity.  · Being overweight.  · Having too much fat, sugar, calories, or salt (sodium) in your diet.  · Drinking too much alcohol.  Some risk factors for high blood pressure may be difficult or impossible to change. Some of these factors include:  · Having chronic kidney disease.  · Having a family history of high blood pressure.  · Age. Risk increases with age.  · Race. You may be at higher risk if you are .  · Gender. Men are at higher risk than women before age 45. After age 65, women are at higher risk than men.  · Having obstructive sleep apnea.  · Stress.  What are the signs or symptoms?  High blood pressure may not cause symptoms. Very " high blood pressure (hypertensive crisis) may cause:  · Headache.  · Anxiety.  · Shortness of breath.  · Nosebleed.  · Nausea and vomiting.  · Vision changes.  · Severe chest pain.  · Seizures.  How is this diagnosed?  This condition is diagnosed by measuring your blood pressure while you are seated, with your arm resting on a flat surface, your legs uncrossed, and your feet flat on the floor. The cuff of the blood pressure monitor will be placed directly against the skin of your upper arm at the level of your heart. It should be measured at least twice using the same arm. Certain conditions can cause a difference in blood pressure between your right and left arms.  Certain factors can cause blood pressure readings to be lower or higher than normal for a short period of time:  · When your blood pressure is higher when you are in a health care provider's office than when you are at home, this is called white coat hypertension. Most people with this condition do not need medicines.  · When your blood pressure is higher at home than when you are in a health care provider's office, this is called masked hypertension. Most people with this condition may need medicines to control blood pressure.  If you have a high blood pressure reading during one visit or you have normal blood pressure with other risk factors, you may be asked to:  · Return on a different day to have your blood pressure checked again.  · Monitor your blood pressure at home for 1 week or longer.  If you are diagnosed with hypertension, you may have other blood or imaging tests to help your health care provider understand your overall risk for other conditions.  How is this treated?  This condition is treated by making healthy lifestyle changes, such as eating healthy foods, exercising more, and reducing your alcohol intake. Your health care provider may prescribe medicine if lifestyle changes are not enough to get your blood pressure under control, and  if:  · Your systolic blood pressure is above 130.  · Your diastolic blood pressure is above 80.  Your personal target blood pressure may vary depending on your medical conditions, your age, and other factors.  Follow these instructions at home:  Eating and drinking    · Eat a diet that is high in fiber and potassium, and low in sodium, added sugar, and fat. An example eating plan is called the DASH (Dietary Approaches to Stop Hypertension) diet. To eat this way:  ? Eat plenty of fresh fruits and vegetables. Try to fill one half of your plate at each meal with fruits and vegetables.  ? Eat whole grains, such as whole-wheat pasta, brown rice, or whole-grain bread. Fill about one fourth of your plate with whole grains.  ? Eat or drink low-fat dairy products, such as skim milk or low-fat yogurt.  ? Avoid fatty cuts of meat, processed or cured meats, and poultry with skin. Fill about one fourth of your plate with lean proteins, such as fish, chicken without skin, beans, eggs, or tofu.  ? Avoid pre-made and processed foods. These tend to be higher in sodium, added sugar, and fat.  · Reduce your daily sodium intake. Most people with hypertension should eat less than 1,500 mg of sodium a day.  · Do not drink alcohol if:  ? Your health care provider tells you not to drink.  ? You are pregnant, may be pregnant, or are planning to become pregnant.  · If you drink alcohol:  ? Limit how much you use to:  § 0-1 drink a day for women.  § 0-2 drinks a day for men.  ? Be aware of how much alcohol is in your drink. In the [place] one drink equals one 12 oz bottle of beer (355 mL), one 5 oz glass of wine (148 mL), or one 1½ oz glass of hard liquor (44 mL).    Lifestyle    · Work with your health care provider to maintain a healthy body weight or to lose weight. Ask what an ideal weight is for you.  · Get at least 30 minutes of exercise most days of the week. Activities may include walking, swimming, or biking.  · Include exercise to  strengthen your muscles (resistance exercise), such as Pilates or lifting weights, as part of your weekly exercise routine. Try to do these types of exercises for 30 minutes at least 3 days a week.  · Do not use any products that contain nicotine or tobacco, such as cigarettes, e-cigarettes, and chewing tobacco. If you need help quitting, ask your health care provider.  · Monitor your blood pressure at home as told by your health care provider.  · Keep all follow-up visits as told by your health care provider. This is important.    Medicines  · Take over-the-counter and prescription medicines only as told by your health care provider. Follow directions carefully. Blood pressure medicines must be taken as prescribed.  · Do not skip doses of blood pressure medicine. Doing this puts you at risk for problems and can make the medicine less effective.  · Ask your health care provider about side effects or reactions to medicines that you should watch for.  Contact a health care provider if you:  · Think you are having a reaction to a medicine you are taking.  · Have headaches that keep coming back (recurring).  · Feel dizzy.  · Have swelling in your ankles.  · Have trouble with your vision.  Get help right away if you:  · Develop a severe headache or confusion.  · Have unusual weakness or numbness.  · Feel faint.  · Have severe pain in your chest or abdomen.  · Vomit repeatedly.  · Have trouble breathing.  Summary  · Hypertension is when the force of blood pumping through your arteries is too strong. If this condition is not controlled, it may put you at risk for serious complications.  · Your personal target blood pressure may vary depending on your medical conditions, your age, and other factors. For most people, a normal blood pressure is less than 120/80.  · Hypertension is treated with lifestyle changes, medicines, or a combination of both. Lifestyle changes include losing weight, eating a healthy, low-sodium diet,  exercising more, and limiting alcohol.  This information is not intended to replace advice given to you by your health care provider. Make sure you discuss any questions you have with your health care provider.  Document Revised: 08/28/2019 Document Reviewed: 08/28/2019  Elsevier Patient Education © 2021 [employer]

## 2021-11-01 NOTE — PROGRESS NOTES
"Chief Complaint  Hypertension    Subjective          Ariel Portillo presents to [facility]  Hypertension  This is a chronic problem. The current episode started more than 1 year ago. The problem is unchanged. The problem is uncontrolled. Pertinent negatives include no anxiety, blurred vision, chest pain, headaches, peripheral edema, PND, shortness of breath or sweats. Risk factors for coronary artery disease include male gender and obesity. Current antihypertension treatment includes calcium channel blockers and angiotensin blockers. The current treatment provides no improvement. There are no compliance problems.        Objective   Vital Signs:   /76   Pulse 96   Temp 97.5 °F (36.4 °C) (Temporal)   Ht 175.3 cm (69.02\")   Wt 102 kg (225 lb)   SpO2 98%   BMI 33.21 kg/m²     Physical Exam  Vitals and nursing note reviewed.   Constitutional:       General: He is not in acute distress.     Appearance: He is well-developed. He is not ill-appearing.   Cardiovascular:      Rate and Rhythm: Normal rate and regular rhythm.      Heart sounds: Normal heart sounds, S1 normal and S2 normal. No murmur heard.      Pulmonary:      Effort: Pulmonary effort is normal.      Breath sounds: Normal breath sounds. No decreased breath sounds, wheezing, rhonchi or rales.   Skin:     General: Skin is warm and dry.   Neurological:      Mental Status: He is alert and oriented to person, place, and time.   Psychiatric:         Speech: Speech normal.         Behavior: Behavior normal. Behavior is cooperative.         Thought Content: Thought content normal.         Judgment: Judgment normal.        Result Review :   The following data was reviewed by: GLENN Carlos on 11/01/2021:  Common labs    Common Labsle 3/22/21   Glucose 86   BUN 19   Creatinine 1.01   eGFR Non  Am 78   eGFR African Am 94   Sodium 140   Potassium 4.1   Chloride 101   Calcium 10.0   Total Protein 6.9   Albumin 4.50 "   Total Bilirubin 0.5   Alkaline Phosphatase 54   AST (SGOT) 26   ALT (SGPT) 27      Comments are available for some flowsheets but are not being displayed.                  Assessment and Plan    Diagnoses and all orders for this visit:    1. Essential hypertension (Primary)  BP remains uncontrolled despite increase in ARB.   Recommend add low dose HCTZ to current therapy.   Continue lifestyle modifications for high blood pressure, high cholesterol, and increased weight. Decrease/eliminate soda, caffeine, alcohol and overall caloric intake. Reduce carbohydrates and sweets in diet.  Continue to improve dietary habits with lean proteins, fresh vegetables, fruits, and nuts. Improve aerobic exercise: walking/biking/swimming daily as tolerated, recommend 30 minutes/day at least 5 days/week.   May need to consider sleep apnea testing.     -     CBC & Differential  -     Comprehensive Metabolic Panel  -     TSH Rfx On Abnormal To Free T4  -     hydroCHLOROthiazide (HYDRODIURIL) 12.5 MG tablet; Take 1 tablet by mouth Daily.  Dispense: 90 tablet; Refill: 1    2. Impaired fasting glucose  -     Hemoglobin A1c    3. Espinal's esophagus without dysplasia    4. Decreased ferritin  -     CBC & Differential    5. Restless leg syndrome    6. Screening for lipid disorders  -     Lipid Panel With / Chol / HDL Ratio    7. Encounter for prostate cancer screening  -     PSA Screen    8. Need for shingles vaccine  -     Shingrix Vaccine    9. Flu vaccine need  -     FluLaval/Fluarix/Fluzone >6 Months        Follow Up   Return in about 4 months (around 3/1/2022) for Next scheduled follow up.  Patient was given instructions and counseling regarding his condition or for health maintenance advice. Please see specific information pulled into the AVS if appropriate.

## 2021-11-02 DIAGNOSIS — J45.909 BRONCHITIS, ALLERGIC, UNSPECIFIED ASTHMA SEVERITY, UNCOMPLICATED: ICD-10-CM

## 2021-11-02 DIAGNOSIS — R05.8 ALLERGIC COUGH: ICD-10-CM

## 2021-11-02 DIAGNOSIS — I10 ESSENTIAL HYPERTENSION: ICD-10-CM

## 2021-11-02 DIAGNOSIS — I10 RESISTANT HYPERTENSION: Primary | ICD-10-CM

## 2021-11-02 DIAGNOSIS — R06.83 SNORING: ICD-10-CM

## 2021-11-02 LAB
ALBUMIN SERPL-MCNC: 4.4 G/DL (ref 3.8–4.9)
ALBUMIN/GLOB SERPL: 1.8 {RATIO} (ref 1.2–2.2)
ALP SERPL-CCNC: 57 IU/L (ref 44–121)
ALT SERPL-CCNC: 23 IU/L (ref 0–44)
AST SERPL-CCNC: 16 IU/L (ref 0–40)
BASOPHILS # BLD AUTO: 0.1 X10E3/UL (ref 0–0.2)
BASOPHILS NFR BLD AUTO: 2 %
BILIRUB SERPL-MCNC: 0.6 MG/DL (ref 0–1.2)
BUN SERPL-MCNC: 20 MG/DL (ref 6–24)
BUN/CREAT SERPL: 18 (ref 9–20)
CALCIUM SERPL-MCNC: 9.6 MG/DL (ref 8.7–10.2)
CHLORIDE SERPL-SCNC: 103 MMOL/L (ref 96–106)
CHOLEST SERPL-MCNC: 206 MG/DL (ref 100–199)
CHOLEST/HDLC SERPL: 3.9 RATIO (ref 0–5)
CO2 SERPL-SCNC: 27 MMOL/L (ref 20–29)
CREAT SERPL-MCNC: 1.13 MG/DL (ref 0.76–1.27)
EOSINOPHIL # BLD AUTO: 0.1 X10E3/UL (ref 0–0.4)
EOSINOPHIL NFR BLD AUTO: 3 %
ERYTHROCYTE [DISTWIDTH] IN BLOOD BY AUTOMATED COUNT: 13.1 % (ref 11.6–15.4)
GLOBULIN SER CALC-MCNC: 2.4 G/DL (ref 1.5–4.5)
GLUCOSE SERPL-MCNC: 91 MG/DL (ref 65–99)
HBA1C MFR BLD: 5.2 % (ref 4.8–5.6)
HCT VFR BLD AUTO: 45.5 % (ref 37.5–51)
HDLC SERPL-MCNC: 53 MG/DL
HGB BLD-MCNC: 15.8 G/DL (ref 13–17.7)
IMM GRANULOCYTES # BLD AUTO: 0 X10E3/UL (ref 0–0.1)
IMM GRANULOCYTES NFR BLD AUTO: 0 %
LDLC SERPL CALC-MCNC: 135 MG/DL (ref 0–99)
LYMPHOCYTES # BLD AUTO: 0.9 X10E3/UL (ref 0.7–3.1)
LYMPHOCYTES NFR BLD AUTO: 21 %
MCH RBC QN AUTO: 29.6 PG (ref 26.6–33)
MCHC RBC AUTO-ENTMCNC: 34.7 G/DL (ref 31.5–35.7)
MCV RBC AUTO: 85 FL (ref 79–97)
MONOCYTES # BLD AUTO: 0.3 X10E3/UL (ref 0.1–0.9)
MONOCYTES NFR BLD AUTO: 7 %
NEUTROPHILS # BLD AUTO: 3 X10E3/UL (ref 1.4–7)
NEUTROPHILS NFR BLD AUTO: 67 %
PLATELET # BLD AUTO: 225 X10E3/UL (ref 150–450)
POTASSIUM SERPL-SCNC: 4.2 MMOL/L (ref 3.5–5.2)
PROT SERPL-MCNC: 6.8 G/DL (ref 6–8.5)
PSA SERPL-MCNC: 1 NG/ML (ref 0–4)
RBC # BLD AUTO: 5.33 X10E6/UL (ref 4.14–5.8)
SODIUM SERPL-SCNC: 142 MMOL/L (ref 134–144)
TRIGL SERPL-MCNC: 98 MG/DL (ref 0–149)
TSH SERPL DL<=0.005 MIU/L-ACNC: 3.29 UIU/ML (ref 0.45–4.5)
VLDLC SERPL CALC-MCNC: 18 MG/DL (ref 5–40)
WBC # BLD AUTO: 4.4 X10E3/UL (ref 3.4–10.8)

## 2021-11-03 RX ORDER — MONTELUKAST SODIUM 10 MG/1
TABLET ORAL
Qty: 90 TABLET | Refills: 1 | Status: SHIPPED | OUTPATIENT
Start: 2021-11-03 | End: 2022-08-30 | Stop reason: SDUPTHER

## 2021-11-03 RX ORDER — VALSARTAN 320 MG/1
TABLET ORAL
Qty: 90 TABLET | Refills: 1 | Status: SHIPPED | OUTPATIENT
Start: 2021-11-03 | End: 2022-06-28 | Stop reason: SDUPTHER

## 2021-11-15 RX ORDER — DEXLANSOPRAZOLE 60 MG/1
CAPSULE, DELAYED RELEASE ORAL
Qty: 90 CAPSULE | Refills: 2 | Status: SHIPPED | OUTPATIENT
Start: 2021-11-15 | End: 2022-08-19 | Stop reason: SDUPTHER

## 2021-12-09 ENCOUNTER — OFFICE VISIT (OUTPATIENT)
Dept: SLEEP MEDICINE | Facility: HOSPITAL | Age: 51
End: 2021-12-09

## 2021-12-09 VITALS
WEIGHT: 226 LBS | DIASTOLIC BLOOD PRESSURE: 88 MMHG | HEIGHT: 69 IN | HEART RATE: 93 BPM | SYSTOLIC BLOOD PRESSURE: 146 MMHG | OXYGEN SATURATION: 98 % | BODY MASS INDEX: 33.47 KG/M2

## 2021-12-09 DIAGNOSIS — G47.33 OSA (OBSTRUCTIVE SLEEP APNEA): Primary | ICD-10-CM

## 2021-12-09 DIAGNOSIS — R06.83 SNORING: ICD-10-CM

## 2021-12-09 DIAGNOSIS — E66.9 OBESITY (BMI 30-39.9): ICD-10-CM

## 2021-12-09 DIAGNOSIS — I10 RESISTANT HYPERTENSION: ICD-10-CM

## 2021-12-09 PROCEDURE — G0463 HOSPITAL OUTPT CLINIC VISIT: HCPCS

## 2021-12-09 NOTE — PROGRESS NOTES
[facility]  Telephone: [phone] / Fax: [phone] [place]  Telephone: [phone] / Fax: [phone] Jeaneth Duggan    Referring Physician: Page Buitrago APRN  PCP: Page Buitrago APRN    Reason for consult:  sleep apnea    Ariel Portillo is a 51 y.o.male  was seen in the [facility] today for evaluation of sleep apnea. He is here today in view of persistent HTN over the past 10-12 years.  BP remains elevated despite 2 medications. He reports snoring but no apneas. He recalls instances of waking himself up from the snoring.  He is unaware of gasping respirations or choking during sleep.  He reports choking when he is laying on his back. He gets about 6 hours of sleep per night.    SH- works in [employer], 2 caffeine    ROS-     FH- dad has SUKI    Ariel Portillo  has a past medical history of Allergic, Espinal esophagus, Esophageal dilatation, GERD (gastroesophageal reflux disease), and Hypertension.    Current Medications:    Current Outpatient Medications:   •  albuterol sulfate  (90 Base) MCG/ACT inhaler, Inhale 1-2 puffs Every 4 (Four) Hours As Needed for Wheezing or Shortness of Air., Disp: 18 g, Rfl: 1  •  Dexilant 60 MG capsule, TAKE 1 CAPSULE BY MOUTH EVERY DAY, Disp: 90 capsule, Rfl: 2  •  diclofenac (VOLTAREN) 1 % gel gel, Apply 4 g topically to the appropriate area as directed 4 (Four) Times a Day As Needed (pain)., Disp: , Rfl:   •  hydroCHLOROthiazide (HYDRODIURIL) 12.5 MG tablet, Take 1 tablet by mouth Daily., Disp: 90 tablet, Rfl: 1  •  levocetirizine (XYZAL) 5 MG tablet, TAKE 1 TABLET BY MOUTH EVERY DAY, Disp: 90 tablet, Rfl: 3  •  mometasone (NASONEX) 50 MCG/ACT nasal spray, ONE SPRAY AND EACH NOSTRIL TWICE DAILY, Disp: 17 g, Rfl: 5  •  montelukast (SINGULAIR) 10 MG tablet, TAKE 1 TABLET BY MOUTH EVERYDAY AT BEDTIME, Disp: 90 tablet, Rfl: 1  •  sildenafil (Viagra) 50 MG tablet, Take 0.5 tablets by mouth Daily As Needed for Erectile  "Dysfunction., Disp: 30 tablet, Rfl: 1  •  Turmeric Curcumin 500 MG capsule, Take 1,000 mg by mouth Daily., Disp: , Rfl:   •  valsartan (DIOVAN) 320 MG tablet, TAKE 1 TABLET BY MOUTH EVERY DAY, Disp: 90 tablet, Rfl: 1    I have reviewed Past Medical History, Past Surgical History, Medication List, Social History and Family History as entered in Sleep Questionnaire and EPIC.    ESS     Vital Signs /88   Pulse 93   Ht 175.3 cm (69.02\")   Wt 103 kg (226 lb)   SpO2 98%   BMI 33.36 kg/m²  Body mass index is 33.36 kg/m².    General Alert and oriented. No acute distress noted   Pharynx/Throat Class  IV  Mallampati airway, large tongue, no evidence of redundant lateral pharyngeal tissue. No oral lesions. No thrush. Moist mucous membranes.   Head Normocephalic. Symmetrical. Atraumatic.    Nose No septal deviation. No drainage   Chest Wall Normal shape. Symmetric expansion with respiration. No tenderness.   Neck Trachea midline, no thyromegaly or adenopathy    Lungs Clear to auscultation bilaterally. No wheezes. No rhonchi. No rales. Respirations regular, even and unlabored.   Heart Regular rhythm and normal rate. Normal S1 and S2. No murmur   Abdomen Soft, non-tender and non-distended. Normal bowel sounds. No masses.   Extremities Moves all extremities well. No edema   Psychiatric Normal mood and affect.        Impression:  1. SUKI (obstructive sleep apnea)    2. Snoring    3. Resistant hypertension    4. Obesity (BMI 30-39.9)          Plan:  I discussed the pathophysiology of obstructive sleep apnea with the patient.  We discussed the adverse outcomes associated with untreated sleep-disordered breathing.  We discussed treatment modalities of obstructive sleep apnea including CPAP device. Sleep study will be scheduled to establish a definitive diagnosis of sleep disorder breathing.  Weight loss will be strongly beneficial in order to reduce the severity of sleep-disordered breathing.  Patient has narrow " oropharyngeal structure.  Caution during activities that require prolonged concentration is strongly advised.  After sleep study results are available, patient will be notified, and appointment will be scheduled to discuss sleep study results and treatment recommendations.      I appreciate the opportunity to participate in this patient's care.      GLENN Maloney  [facility]  Phone: [phone]      Part of this note may be an electronic transcription/translation of spoken language to printed text using the Dragon Dictation System. Some errors may exist even though the document was edited.

## 2022-01-20 ENCOUNTER — TELEPHONE (OUTPATIENT)
Dept: INTERNAL MEDICINE | Age: 52
End: 2022-01-20

## 2022-06-23 ENCOUNTER — TELEPHONE (OUTPATIENT)
Dept: INTERNAL MEDICINE | Age: 52
End: 2022-06-23

## 2022-06-23 ENCOUNTER — HOSPITAL ENCOUNTER (OUTPATIENT)
Dept: GENERAL RADIOLOGY | Facility: HOSPITAL | Age: 52
Discharge: HOME | End: 2022-06-23
Admitting: NURSE PRACTITIONER

## 2022-06-23 ENCOUNTER — OFFICE VISIT (OUTPATIENT)
Dept: INTERNAL MEDICINE | Age: 52
End: 2022-06-23

## 2022-06-23 VITALS
TEMPERATURE: 97.3 F | OXYGEN SATURATION: 98 % | HEIGHT: 69 IN | SYSTOLIC BLOOD PRESSURE: 154 MMHG | BODY MASS INDEX: 33.83 KG/M2 | WEIGHT: 228.4 LBS | HEART RATE: 90 BPM | DIASTOLIC BLOOD PRESSURE: 92 MMHG

## 2022-06-23 DIAGNOSIS — I10 RESISTANT HYPERTENSION: ICD-10-CM

## 2022-06-23 DIAGNOSIS — K22.70 BARRETT'S ESOPHAGUS WITHOUT DYSPLASIA: ICD-10-CM

## 2022-06-23 DIAGNOSIS — I10 ESSENTIAL HYPERTENSION: Primary | ICD-10-CM

## 2022-06-23 DIAGNOSIS — Z91.09 ENVIRONMENTAL ALLERGIES: ICD-10-CM

## 2022-06-23 DIAGNOSIS — M54.12 CERVICAL RADICULOPATHY: ICD-10-CM

## 2022-06-23 PROCEDURE — 72040 X-RAY EXAM NECK SPINE 2-3 VW: CPT

## 2022-06-23 PROCEDURE — 93000 ELECTROCARDIOGRAM COMPLETE: CPT | Performed by: NURSE PRACTITIONER

## 2022-06-23 PROCEDURE — 99214 OFFICE O/P EST MOD 30 MIN: CPT | Performed by: NURSE PRACTITIONER

## 2022-06-23 RX ORDER — ALBUTEROL SULFATE 90 UG/1
1-2 AEROSOL, METERED RESPIRATORY (INHALATION) EVERY 4 HOURS PRN
Qty: 18 G | Refills: 1 | Status: SHIPPED | OUTPATIENT
Start: 2022-06-23 | End: 2023-04-04 | Stop reason: SDUPTHER

## 2022-06-23 RX ORDER — HYDROCHLOROTHIAZIDE 25 MG/1
25 TABLET ORAL DAILY
Qty: 90 TABLET | Refills: 3 | Status: SHIPPED | OUTPATIENT
Start: 2022-06-23 | End: 2022-09-05

## 2022-06-23 NOTE — TELEPHONE ENCOUNTER
Mustapha wrote - Return in about 2 weeks (around 7/7/2022) for Next scheduled follow up. In here office note.      - there is not a scheduled appt. Made an appt for 7/07/2022 @ 8:30 AM.

## 2022-06-23 NOTE — PROGRESS NOTES
"    I N T E R N A L  M E D I C I N E  CLEVE ROCHA, APRN      ENCOUNTER DATE:  06/23/2022    Ariel Portillo / 51 y.o. / male      CHIEF COMPLAINT / REASON FOR OFFICE VISIT     Establish Care, Hypertension, and Hyperlipidemia      ASSESSMENT & PLAN     1. Essential hypertension  -Continue valsartan 320 mg daily  -Increase hydrochlorothiazide from 12.5 to 25 mg daily  -Check blood pressure at least 2-3 times weekly and follow-up in 2 to 3 weeks for office visit    2. Espinal's esophagus without dysplasia  -Continue Dexilant, follow-up with the GI    3. Resistant hypertension  - Recommend following up with sleep apnea home testing  -EKG normal sinus rhythm  - Duplex Renal Artery - Bilateral Complete CAR; Future    4. Cervical radiculopathy  - XR Spine Cervical 2 or 3 View    5 Environmental allergies.   -Continue Singulair, Xyzal, Nasonex and albuterol as needed    Orders Placed This Encounter   Procedures   • XR Spine Cervical 2 or 3 View     New Medications Ordered This Visit   Medications   • albuterol sulfate  (90 Base) MCG/ACT inhaler     Sig: Inhale 1-2 puffs Every 4 (Four) Hours As Needed for Wheezing or Shortness of Air.     Dispense:  18 g     Refill:  1   • hydroCHLOROthiazide (HYDRODIURIL) 25 MG tablet     Sig: Take 1 tablet by mouth Daily.     Dispense:  90 tablet     Refill:  3       SUMMARY/DISCUSSION  • Follow-up in 2 to 3 weeks to determine need for further blood pressure treatment  • At next office visit we will update fasting labs as patient is nonfasting today at    Next Appointment with me: Visit date not found    Return in about 2 weeks (around 7/7/2022) for Next scheduled follow up.      VITAL SIGNS     Visit Vitals  /92   Pulse 90   Temp 97.3 °F (36.3 °C) (Temporal)   Ht 175.8 cm (69.2\")   Wt 104 kg (228 lb 6.4 oz)   SpO2 98%   BMI 33.53 kg/m²        Wt Readings from Last 3 Encounters:   06/23/22 104 kg (228 lb 6.4 oz)   12/09/21 103 kg (226 lb)   11/01/21 102 kg (225 lb)     Body " "mass index is 33.53 kg/m².      MEDICATIONS AT THE TIME OF OFFICE VISIT     Current Outpatient Medications on File Prior to Visit   Medication Sig   • Dexilant 60 MG capsule TAKE 1 CAPSULE BY MOUTH EVERY DAY   • levocetirizine (XYZAL) 5 MG tablet TAKE 1 TABLET BY MOUTH EVERY DAY   • mometasone (NASONEX) 50 MCG/ACT nasal spray ONE SPRAY AND EACH NOSTRIL TWICE DAILY   • montelukast (SINGULAIR) 10 MG tablet TAKE 1 TABLET BY MOUTH EVERYDAY AT BEDTIME   • sildenafil (Viagra) 50 MG tablet Take 0.5 tablets by mouth Daily As Needed for Erectile Dysfunction.   • valsartan (DIOVAN) 320 MG tablet TAKE 1 TABLET BY MOUTH EVERY DAY   • [DISCONTINUED] hydroCHLOROthiazide (HYDRODIURIL) 12.5 MG tablet Take 1 tablet by mouth Daily.   • diclofenac (VOLTAREN) 1 % gel gel Apply 4 g topically to the appropriate area as directed 4 (Four) Times a Day As Needed (pain).   • Turmeric Curcumin 500 MG capsule Take 1,000 mg by mouth Daily.   • [DISCONTINUED] albuterol sulfate  (90 Base) MCG/ACT inhaler Inhale 1-2 puffs Every 4 (Four) Hours As Needed for Wheezing or Shortness of Air.     No current facility-administered medications on file prior to visit.         HISTORY OF PRESENT ILLNESS     Patient presents to establish care with new provider in office.  Previous patient of Page ELIZABETH followed for hypertension, impaired fasting glucose, Espinal's esophagus without dysplasia, cholesterol followed every 4 months    Recent evaluation by sleep medicine due to resistant hypertension, snoring and obesity.  Home sleep study ordered.  Patient has not completed due to scheduling with \"switching from day to third shift.    Follow-up with Dr. Kapoor gastroenterology last seen June 2021 for heartburn evaluation.  EGD 1 year prior to evaluation showed small hiatal hernia and benign gastric polyp.  Heartburn controlled with Dexilant however he still has issues with chronic intermittent cough worse in the winter better in the summer.  Chronic " intermittent sore throat.  Allergies are fairly well controlled now with Xyzal, Nasonex, albuterol as needed.  Normal colonoscopy in 2020.     Hypertension: Current compliance of valsartan 320 and hydrochlorothiazide 12.5 mg daily.  Blood pressure today 150 over 90s, Ddnies any chest pain, shortness of air, headache, visual disturbances, lower extremity leg swelling, or heart palpitations.     Hyperlipidemia with LDL of 135, triglycerides of 98 and total cholesterol 206 ASCVD risk score 6.7%.    Pulmonary function test in 2018 negative.    Last PSA 1.0, 1 year prior 0.97.  No velocity increase.    Never smoker, social alcohol use.     Patient does complain of cervical pain with left-sided radiculopathy,  previous treatment with chiropractor in the past.     REVIEW OF SYSTEMS     Constitutional neg except per HPI   Resp neg  CV neg  Musc cervical pain with radiation to the left upper extremity    PHYSICAL EXAMINATION     Physical Exam  Constitutional  No distress  Cardiovascular Rate  normal . Rhythm: regular . Heart sounds:  normal  Pulmonary/Chest  Effort normal. Breath sounds:  normal  Psychiatric  Alert. Judgment and thought content normal. Mood normal     REVIEWED DATA     Labs:   Lab Results   Component Value Date    GLUCOSE 91 11/01/2021    BUN 20 11/01/2021    CREATININE 1.13 11/01/2021    EGFRIFNONA 74 11/01/2021    EGFRIFAFRI 86 11/01/2021    BCR 18 11/01/2021    K 4.2 11/01/2021    CO2 27 11/01/2021    CALCIUM 9.6 11/01/2021    PROTENTOTREF 6.8 11/01/2021    ALBUMIN 4.4 11/01/2021    LABIL2 1.8 11/01/2021    AST 16 11/01/2021    ALT 23 11/01/2021     Lab Results   Component Value Date    CHLPL 206 (H) 11/01/2021    TRIG 98 11/01/2021    HDL 53 11/01/2021     (H) 11/01/2021     Lab Results   Component Value Date    WBC 4.4 11/01/2021    HGB 15.8 11/01/2021    HCT 45.5 11/01/2021    MCV 85 11/01/2021     11/01/2021     Lab Results   Component Value Date    TSH 3.290 11/01/2021     Lab Results    Component Value Date    HGBA1C 5.2 11/01/2021         Imaging:       ECG 12 Lead    Date/Time: 6/23/2022 4:06 PM  Performed by: Mustapha Londono APRN  Authorized by: Mustapha Londono APRN   Previous ECG: no previous ECG available  Rhythm: sinus rhythm  Rate: normal  Conduction: conduction normal  ST Segments: ST segments normal  T Waves: T waves normal  QRS axis: normal    Clinical impression: normal ECG              Medical Tests:             Summary of old records / correspondence / consultant report:           Request outside records:           *Examiner was wearing medical surgical mask, face shield and exam gloves during the entire duration of the visit. Patient was masked the entire time.   Minimum social distance of 6 ft maintained entire visit except if physical contact was necessary as documented.     Dictated utilizing Dragon dictation

## 2022-06-28 DIAGNOSIS — I10 ESSENTIAL HYPERTENSION: ICD-10-CM

## 2022-06-28 RX ORDER — VALSARTAN 320 MG/1
320 TABLET ORAL DAILY
Qty: 90 TABLET | Refills: 3 | Status: SHIPPED | OUTPATIENT
Start: 2022-06-28

## 2022-08-19 RX ORDER — DEXLANSOPRAZOLE 60 MG/1
1 CAPSULE, DELAYED RELEASE ORAL DAILY
Qty: 90 CAPSULE | Refills: 1 | Status: SHIPPED | OUTPATIENT
Start: 2022-08-19 | End: 2022-08-24 | Stop reason: SDUPTHER

## 2022-08-24 NOTE — TELEPHONE ENCOUNTER
Caller: Ariel Portillo    Relationship: Self    Best call back number:[phone]      Requested Prescriptions:   Requested Prescriptions     Pending Prescriptions Disp Refills   • dexlansoprazole (Dexilant) 60 MG capsule 90 capsule 1     Sig: Take 1 capsule by mouth Daily.        Pharmacy where request should be sent: [facility] #[id number] - [place], [place] - [address] [place] [address] RD. AT [facility] - [phone] [facility] [phone]      Additional details provided by patient: PATIENT IS COMPLETELY OUT OF THIS MEDICATION.    PATIENT WOULD LIKE TO REQUEST A 90 DAY SUPPLY.   PATIENT SET OFFICE VISIT APPOINTMENT ON 8/30 AT 9 AM.    PLEASE CONTACT PATIENT IF NEEDED     Does the patient have less than a 3 day supply:  [x] Yes  [] No    Jameson CARTAGENA Rep   08/24/22 15:08 EDT

## 2022-08-25 RX ORDER — DEXLANSOPRAZOLE 60 MG/1
1 CAPSULE, DELAYED RELEASE ORAL DAILY
Qty: 90 CAPSULE | Refills: 1 | Status: SHIPPED | OUTPATIENT
Start: 2022-08-25 | End: 2022-09-08

## 2022-08-30 ENCOUNTER — OFFICE VISIT (OUTPATIENT)
Dept: INTERNAL MEDICINE | Age: 52
End: 2022-08-30

## 2022-08-30 VITALS
BODY MASS INDEX: 32.53 KG/M2 | OXYGEN SATURATION: 98 % | SYSTOLIC BLOOD PRESSURE: 122 MMHG | HEART RATE: 77 BPM | TEMPERATURE: 97.1 F | DIASTOLIC BLOOD PRESSURE: 80 MMHG | HEIGHT: 69 IN | WEIGHT: 219.6 LBS

## 2022-08-30 DIAGNOSIS — N52.9 ERECTILE DYSFUNCTION, UNSPECIFIED ERECTILE DYSFUNCTION TYPE: ICD-10-CM

## 2022-08-30 DIAGNOSIS — I10 ESSENTIAL HYPERTENSION: Primary | ICD-10-CM

## 2022-08-30 DIAGNOSIS — R05.8 ALLERGIC COUGH: ICD-10-CM

## 2022-08-30 DIAGNOSIS — J45.998 SEASONAL ASTHMA: ICD-10-CM

## 2022-08-30 DIAGNOSIS — E78.5 HYPERLIPIDEMIA, UNSPECIFIED HYPERLIPIDEMIA TYPE: ICD-10-CM

## 2022-08-30 DIAGNOSIS — K21.00 GASTROESOPHAGEAL REFLUX DISEASE WITH ESOPHAGITIS WITHOUT HEMORRHAGE: ICD-10-CM

## 2022-08-30 PROCEDURE — 99214 OFFICE O/P EST MOD 30 MIN: CPT | Performed by: NURSE PRACTITIONER

## 2022-08-30 PROCEDURE — 91305 COVID-19 (PFIZER) 12+ YRS: CPT | Performed by: NURSE PRACTITIONER

## 2022-08-30 PROCEDURE — 0054A COVID-19 (PFIZER) 12+ YRS: CPT | Performed by: NURSE PRACTITIONER

## 2022-08-30 RX ORDER — MONTELUKAST SODIUM 10 MG/1
10 TABLET ORAL
Qty: 90 TABLET | Refills: 3 | Status: SHIPPED | OUTPATIENT
Start: 2022-08-30

## 2022-08-30 NOTE — PROGRESS NOTES
I N T E R N A L  M E D I C I N E  CLEVE ROCHA APRDEWAYNE      ENCOUNTER DATE:  08/30/2022    Ariel Cruzorgio / 51 y.o. / male      CHIEF COMPLAINT / REASON FOR OFFICE VISIT     Hypertension, Hyperlipidemia, Allergies, and Heartburn      ASSESSMENT & PLAN     1. Essential hypertension  -Continue hydrochlorothiazide 25 mg daily, valsartan 320  - CBC & Differential  - Comprehensive Metabolic Panel  - TSH+Free T4  - Urinalysis With Microscopic If Indicated (No Culture) - Urine, Clean Catch    2. Allergic cough  - continue xyzal 5mg daily   - nasonex daily   - montelukast (SINGULAIR) 10 MG tablet; Take 1 tablet by mouth every night at bedtime.  Dispense: 90 tablet; Refill: 3    3. Seasonal asthma  - albuterol as needed   - montelukast (SINGULAIR) 10 MG tablet; Take 1 tablet by mouth every night at bedtime.  Dispense: 90 tablet; Refill: 3    4. Gastroesophageal reflux disease, unspecified whether esophagitis present  - pending dexilant with new insurance   - increase OTC nexium to 40mg, mychart message if uncontrolled will send in protonix in the interim if needed     5. Erectile dysfunction, unspecified erectile dysfunction type  - viagra as needed 8   - Testosterone, Free, Total  - PSA DIAGNOSTIC    6. Hyperlipidemia, unspecified hyperlipidemia type  - Decrease/eliminate soda, caffeine, alcohol and overall caloric intake. Reduce carbohydrates and sweets in diet.  Continue to improve dietary habits with lean proteins, fresh vegetables, fruits, and nuts. Improve aerobic exercise: walking/biking/swimming daily as tolerated, recommend 30 minutes/day at least 5 days/week.  - Lipid Panel With / Chol / HDL Ratio    Orders Placed This Encounter   Procedures   • COVID-19 Vaccine (Pfizer) Gray Cap   • Testosterone, Free, Total   • Comprehensive Metabolic Panel   • Lipid Panel With / Chol / HDL Ratio   • TSH+Free T4   • Urinalysis With Microscopic If Indicated (No Culture) - Urine, Clean Catch   • PSA DIAGNOSTIC   • CBC &  "Differential     New Medications Ordered This Visit   Medications   • montelukast (SINGULAIR) 10 MG tablet     Sig: Take 1 tablet by mouth every night at bedtime.     Dispense:  90 tablet     Refill:  3       SUMMARY/DISCUSSION  • Follow-up in 6 months for chronic medical, earlier if needed    Next Appointment with me: Visit date not found    Return in about 6 months (around 2/28/2023) for Next scheduled follow up.      VITAL SIGNS     Visit Vitals  /80   Pulse 77   Temp 97.1 °F (36.2 °C) (Temporal)   Ht 175.8 cm (69.2\")   Wt 99.6 kg (219 lb 9.6 oz)   SpO2 98%   BMI 32.24 kg/m²     Wt Readings from Last 3 Encounters:   08/30/22 99.6 kg (219 lb 9.6 oz)   06/23/22 104 kg (228 lb 6.4 oz)   12/09/21 103 kg (226 lb)     Body mass index is 32.24 kg/m².      MEDICATIONS AT THE TIME OF OFFICE VISIT     Current Outpatient Medications on File Prior to Visit   Medication Sig   • albuterol sulfate  (90 Base) MCG/ACT inhaler Inhale 1-2 puffs Every 4 (Four) Hours As Needed for Wheezing or Shortness of Air.   • hydroCHLOROthiazide (HYDRODIURIL) 25 MG tablet Take 1 tablet by mouth Daily.   • levocetirizine (XYZAL) 5 MG tablet TAKE 1 TABLET BY MOUTH EVERY DAY   • mometasone (NASONEX) 50 MCG/ACT nasal spray ONE SPRAY AND EACH NOSTRIL TWICE DAILY   • valsartan (DIOVAN) 320 MG tablet Take 1 tablet by mouth Daily.   • [DISCONTINUED] montelukast (SINGULAIR) 10 MG tablet TAKE 1 TABLET BY MOUTH EVERYDAY AT BEDTIME   • dexlansoprazole (Dexilant) 60 MG capsule Take 1 capsule by mouth Daily.   • diclofenac (VOLTAREN) 1 % gel gel Apply 4 g topically to the appropriate area as directed 4 (Four) Times a Day As Needed (pain).   • sildenafil (Viagra) 50 MG tablet Take 0.5 tablets by mouth Daily As Needed for Erectile Dysfunction.   • Turmeric Curcumin 500 MG capsule Take 1,000 mg by mouth Daily.     No current facility-administered medications on file prior to visit.         HISTORY OF PRESENT ILLNESS     Hypertension now well " controlled with increasing hydrochlorothiazide from 12.5 to 25 mg daily.  Continued on valsartan 320. Denies any chest pain, shortness of air, headache, visual disturbances, lower extremity leg swelling, or heart palpitations.     Seasonal allergies and asthma mildly uncontrolled as he is ran of his Singulair 10 mg daily.  Has albuterol as needed, Xyzal and Nasonex daily.  No wheezing or significant shortness of breath.      Espinal's esophagus without dysplasia, plans to have updated EGD with gastroenterology.  Currently transitioning physicians at [employer], new insurance, working with insurance to have Dexilant paid for.  He is currently taking Nexium 20 mg over-the-counter in lieu of Dexilant.  Symptoms are not controlled currently    Has not rescheduled sleep study.     Hyperlipidemia with LDL of 135, triglycerides of 98 and total cholesterol 206 ASCVD risk score 4.9%.  Routine exercise.    Intermittent erectile dysfunction, requesting testosterone levels.  Controlled with Viagra as needed    REVIEW OF SYSTEMS     Constitutional neg except per HPI   Resp neg  CV neg  GI GERD   ED      PHYSICAL EXAMINATION     Physical Exam  Constitutional  No distress  Cardiovascular Rate  normal . Rhythm: regular . Heart sounds:  normal  Pulmonary/Chest  Effort normal. Breath sounds:  normal  Psychiatric  Alert. Judgment and thought content normal. Mood normal     REVIEWED DATA     Labs:   Lab Results   Component Value Date    GLUCOSE 91 11/01/2021    BUN 20 11/01/2021    CREATININE 1.13 11/01/2021    EGFRIFNONA 74 11/01/2021    EGFRIFAFRI 86 11/01/2021    BCR 18 11/01/2021    K 4.2 11/01/2021    CO2 27 11/01/2021    CALCIUM 9.6 11/01/2021    PROTENTOTREF 6.8 11/01/2021    ALBUMIN 4.4 11/01/2021    LABIL2 1.8 11/01/2021    AST 16 11/01/2021    ALT 23 11/01/2021     Lab Results   Component Value Date    CHLPL 206 (H) 11/01/2021    TRIG 98 11/01/2021    HDL 53 11/01/2021     (H) 11/01/2021     Lab Results   Component Value  Date    WBC 4.4 11/01/2021    HGB 15.8 11/01/2021    HCT 45.5 11/01/2021    MCV 85 11/01/2021     11/01/2021     Lab Results   Component Value Date    HGBA1C 5.2 11/01/2021     Lab Results   Component Value Date    TSH 3.290 11/01/2021         Imaging:           Medical Tests:             Summary of old records / correspondence / consultant report:           Request outside records:           *Examiner was wearing medical surgical mask, face shield and exam gloves during the entire duration of the visit. Patient was masked the entire time.   Minimum social distance of 6 ft maintained entire visit except if physical contact was necessary as documented.     Dictated utilizing Dragon dictation

## 2022-09-03 LAB
ALBUMIN SERPL-MCNC: 4.5 G/DL (ref 3.5–5.2)
ALBUMIN/GLOB SERPL: 2.1 G/DL
ALP SERPL-CCNC: 57 U/L (ref 39–117)
ALT SERPL-CCNC: 39 U/L (ref 1–41)
APPEARANCE UR: CLEAR
AST SERPL-CCNC: 56 U/L (ref 1–40)
BACTERIA #/AREA URNS HPF: ABNORMAL /HPF
BASOPHILS # BLD AUTO: 0.07 10*3/MM3 (ref 0–0.2)
BASOPHILS NFR BLD AUTO: 1.3 % (ref 0–1.5)
BILIRUB SERPL-MCNC: 0.8 MG/DL (ref 0–1.2)
BILIRUB UR QL STRIP: NEGATIVE
BUN SERPL-MCNC: 31 MG/DL (ref 6–20)
BUN/CREAT SERPL: 22.8 (ref 7–25)
CALCIUM SERPL-MCNC: 9.8 MG/DL (ref 8.6–10.5)
CASTS URNS MICRO: ABNORMAL
CHLORIDE SERPL-SCNC: 102 MMOL/L (ref 98–107)
CHOLEST SERPL-MCNC: 200 MG/DL (ref 0–200)
CHOLEST/HDLC SERPL: 4.17 {RATIO}
CO2 SERPL-SCNC: 29.9 MMOL/L (ref 22–29)
COLOR UR: YELLOW
CREAT SERPL-MCNC: 1.36 MG/DL (ref 0.76–1.27)
EGFRCR-CYS SERPLBLD CKD-EPI 2021: 62.2 ML/MIN/1.73
EOSINOPHIL # BLD AUTO: 0.21 10*3/MM3 (ref 0–0.4)
EOSINOPHIL NFR BLD AUTO: 4 % (ref 0.3–6.2)
EPI CELLS #/AREA URNS HPF: ABNORMAL /HPF
ERYTHROCYTE [DISTWIDTH] IN BLOOD BY AUTOMATED COUNT: 13.5 % (ref 12.3–15.4)
GLOBULIN SER CALC-MCNC: 2.1 GM/DL
GLUCOSE SERPL-MCNC: 94 MG/DL (ref 65–99)
GLUCOSE UR QL STRIP: NEGATIVE
HCT VFR BLD AUTO: 46.2 % (ref 37.5–51)
HDLC SERPL-MCNC: 48 MG/DL (ref 40–60)
HGB BLD-MCNC: 15.6 G/DL (ref 13–17.7)
HGB UR QL STRIP: NEGATIVE
IMM GRANULOCYTES # BLD AUTO: 0.01 10*3/MM3 (ref 0–0.05)
IMM GRANULOCYTES NFR BLD AUTO: 0.2 % (ref 0–0.5)
KETONES UR QL STRIP: NEGATIVE
LDLC SERPL CALC-MCNC: 136 MG/DL (ref 0–100)
LEUKOCYTE ESTERASE UR QL STRIP: ABNORMAL
LYMPHOCYTES # BLD AUTO: 1.38 10*3/MM3 (ref 0.7–3.1)
LYMPHOCYTES NFR BLD AUTO: 26.1 % (ref 19.6–45.3)
MCH RBC QN AUTO: 29.8 PG (ref 26.6–33)
MCHC RBC AUTO-ENTMCNC: 33.8 G/DL (ref 31.5–35.7)
MCV RBC AUTO: 88.2 FL (ref 79–97)
MONOCYTES # BLD AUTO: 0.41 10*3/MM3 (ref 0.1–0.9)
MONOCYTES NFR BLD AUTO: 7.8 % (ref 5–12)
NEUTROPHILS # BLD AUTO: 3.21 10*3/MM3 (ref 1.7–7)
NEUTROPHILS NFR BLD AUTO: 60.6 % (ref 42.7–76)
NITRITE UR QL STRIP: NEGATIVE
NRBC BLD AUTO-RTO: 0 /100 WBC (ref 0–0.2)
PH UR STRIP: 5.5 [PH] (ref 5–8)
PLATELET # BLD AUTO: 243 10*3/MM3 (ref 140–450)
POTASSIUM SERPL-SCNC: 3.8 MMOL/L (ref 3.5–5.2)
PROT SERPL-MCNC: 6.6 G/DL (ref 6–8.5)
PROT UR QL STRIP: NEGATIVE
PSA SERPL-MCNC: 1.36 NG/ML (ref 0–4)
RBC # BLD AUTO: 5.24 10*6/MM3 (ref 4.14–5.8)
RBC #/AREA URNS HPF: ABNORMAL /HPF
SODIUM SERPL-SCNC: 144 MMOL/L (ref 136–145)
SP GR UR STRIP: 1.03 (ref 1–1.03)
T4 FREE SERPL-MCNC: 1.19 NG/DL (ref 0.93–1.7)
TESTOST FREE SERPL-MCNC: 6 PG/ML (ref 7.2–24)
TESTOST SERPL-MCNC: 330 NG/DL (ref 264–916)
TRIGL SERPL-MCNC: 90 MG/DL (ref 0–150)
TSH SERPL DL<=0.005 MIU/L-ACNC: 5.29 UIU/ML (ref 0.27–4.2)
UROBILINOGEN UR STRIP-MCNC: ABNORMAL MG/DL
VLDLC SERPL CALC-MCNC: 16 MG/DL (ref 5–40)
WBC # BLD AUTO: 5.29 10*3/MM3 (ref 3.4–10.8)
WBC #/AREA URNS HPF: ABNORMAL /HPF

## 2022-09-05 DIAGNOSIS — I10 PRIMARY HYPERTENSION: Primary | ICD-10-CM

## 2022-09-05 RX ORDER — AMLODIPINE BESYLATE 5 MG/1
5 TABLET ORAL DAILY
Qty: 90 TABLET | Refills: 1 | Status: SHIPPED | OUTPATIENT
Start: 2022-09-05 | End: 2023-03-06

## 2022-09-05 RX ORDER — HYDROCHLOROTHIAZIDE 25 MG/1
12.5 TABLET ORAL DAILY
Qty: 90 TABLET | Refills: 3
Start: 2022-09-05

## 2022-09-06 ENCOUNTER — TELEPHONE (OUTPATIENT)
Dept: INTERNAL MEDICINE | Age: 52
End: 2022-09-06

## 2022-09-06 NOTE — TELEPHONE ENCOUNTER
DAVIDTVM INSTRUCTING PT TO RETURN CALL TO BE READ LAB RESULTS. LETTER SENT VIA [employer]/MAILED. MM

## 2022-09-06 NOTE — TELEPHONE ENCOUNTER
----- Message from GLENN Cristina sent at 9/5/2022 10:12 AM EDT -----  Please call patient.  Needs blood pressure modification, please ask about Tylenol or alcohol with AST.  Please let me know if he needs urology referral    Free testosterone is low and total testosterone is low end of normal.  If you do not already have a urologist please let me know and I am more than happy to refer you.   Increasing hydrochlorothiazide from 12.5-25 has significantly helped your blood pressure, however it has mildly decreased her kidney function.  I would like you to return to hydrochlorothiazide at 12.5 mg dosing, please half the 25 mg tablet if that is what you are using.  You may continue the valsartan.  I am going to add another medication called amlodipine.  I am going to send in a 5 mg tablet, however I would like you to start at 2.5 mg, or half a tablet.  If your blood pressure is still in the 130s over 70 to 80s I would like you to increase to the full 5 mg.  Lets recheck your kidney function in 1 month with a lab only appointment.  Your AST which is a liver enzyme is significantly elevated.  Have you been increasing amounts of alcohol or Tylenol?  Cholesterol is mildly increased.  At this time please practice healthy diet and exercise.  Thyroid is subclinical, at this time we will continue to monitor unless you are significantly fatigued, unexplained weight gain or constipation.

## 2022-09-08 DIAGNOSIS — R79.89 LOW TESTOSTERONE: Primary | ICD-10-CM

## 2022-09-08 RX ORDER — PANTOPRAZOLE SODIUM 40 MG/1
40 TABLET, DELAYED RELEASE ORAL DAILY
Qty: 90 TABLET | Refills: 3 | Status: SHIPPED | OUTPATIENT
Start: 2022-09-08

## 2023-03-06 RX ORDER — AMLODIPINE BESYLATE 5 MG/1
TABLET ORAL
Qty: 90 TABLET | Refills: 0 | Status: SHIPPED | OUTPATIENT
Start: 2023-03-06

## 2023-03-23 ENCOUNTER — TELEPHONE (OUTPATIENT)
Dept: INTERNAL MEDICINE | Age: 53
End: 2023-03-23
Payer: COMMERCIAL

## 2023-03-23 DIAGNOSIS — Z00.00 ROUTINE ADULT HEALTH MAINTENANCE: Primary | ICD-10-CM

## 2023-03-23 DIAGNOSIS — Z12.5 SCREENING PSA (PROSTATE SPECIFIC ANTIGEN): ICD-10-CM

## 2023-04-04 ENCOUNTER — OFFICE VISIT (OUTPATIENT)
Dept: INTERNAL MEDICINE | Age: 53
End: 2023-04-04
Payer: COMMERCIAL

## 2023-04-04 VITALS
WEIGHT: 222.4 LBS | TEMPERATURE: 97.3 F | HEIGHT: 69 IN | SYSTOLIC BLOOD PRESSURE: 130 MMHG | BODY MASS INDEX: 32.94 KG/M2 | HEART RATE: 101 BPM | DIASTOLIC BLOOD PRESSURE: 74 MMHG | OXYGEN SATURATION: 98 %

## 2023-04-04 DIAGNOSIS — I10 ESSENTIAL HYPERTENSION: Primary | ICD-10-CM

## 2023-04-04 DIAGNOSIS — E78.5 HYPERLIPIDEMIA, UNSPECIFIED HYPERLIPIDEMIA TYPE: ICD-10-CM

## 2023-04-04 DIAGNOSIS — J45.998 SEASONAL ASTHMA: ICD-10-CM

## 2023-04-04 DIAGNOSIS — K22.70 BARRETT'S ESOPHAGUS WITHOUT DYSPLASIA: ICD-10-CM

## 2023-04-04 DIAGNOSIS — R12 HEARTBURN: ICD-10-CM

## 2023-04-04 DIAGNOSIS — R05.8 ALLERGIC COUGH: ICD-10-CM

## 2023-04-04 DIAGNOSIS — N52.9 ERECTILE DYSFUNCTION, UNSPECIFIED ERECTILE DYSFUNCTION TYPE: ICD-10-CM

## 2023-04-04 DIAGNOSIS — R82.998 URINE LEUKOCYTES: ICD-10-CM

## 2023-04-04 PROCEDURE — 99214 OFFICE O/P EST MOD 30 MIN: CPT | Performed by: NURSE PRACTITIONER

## 2023-04-04 RX ORDER — LEVOCETIRIZINE DIHYDROCHLORIDE 5 MG/1
5 TABLET, FILM COATED ORAL DAILY
Qty: 90 TABLET | Refills: 3
Start: 2023-04-04

## 2023-04-04 RX ORDER — MOMETASONE FUROATE 50 UG/1
SPRAY, METERED NASAL
Qty: 17 G | Refills: 5
Start: 2023-04-04

## 2023-04-04 RX ORDER — DEXLANSOPRAZOLE 60 MG/1
60 CAPSULE, DELAYED RELEASE ORAL DAILY
Qty: 30 CAPSULE | Refills: 0 | Status: SHIPPED | OUTPATIENT
Start: 2023-04-04 | End: 2023-05-04

## 2023-04-04 RX ORDER — ALBUTEROL SULFATE 90 UG/1
1-2 AEROSOL, METERED RESPIRATORY (INHALATION) EVERY 4 HOURS PRN
Qty: 18 G | Refills: 1
Start: 2023-04-04

## 2023-04-04 RX ORDER — SILDENAFIL 50 MG/1
25 TABLET, FILM COATED ORAL DAILY PRN
Qty: 30 TABLET | Refills: 1
Start: 2023-04-04

## 2023-04-04 NOTE — ASSESSMENT & PLAN NOTE
We will see if Dexilant is now affordable with change of insurance, if not continue Protonix 40 mg and follow-up with gastroenterology as referred to today

## 2023-04-04 NOTE — PROGRESS NOTES
I N T E R N A L  M E D I C I N E  GLENN ISRAEL      ENCOUNTER DATE:  04/04/2023    Ariel Portillo / 52 y.o. / male      CHIEF COMPLAINT / REASON FOR OFFICE VISIT     Hypertension, Allergies, Hyperlipidemia, and Heartburn      ASSESSMENT & PLAN     Problem List Items Addressed This Visit        Cardiac and Vasculature    Essential hypertension - Primary    Relevant Medications    valsartan (DIOVAN) 320 MG tablet    hydroCHLOROthiazide (HYDRODIURIL) 25 MG tablet    amLODIPine (NORVASC) 5 MG tablet    Hyperlipidemia    Current Assessment & Plan     Decrease/eliminate soda, caffeine, alcohol and overall caloric intake. Reduce carbohydrates and sweets in diet.  Continue to improve dietary habits with lean proteins, fresh vegetables, fruits, and nuts. Improve aerobic exercise: walking/biking/swimming daily as tolerated, recommend 30 minutes/day at least 5 days/week.    Discussed consideration of CT cardiac calcium score in the future which he will consider with family  history of heart disease            Gastrointestinal Abdominal     Espinal's esophagus without dysplasia    Relevant Medications    pantoprazole (Protonix) 40 MG EC tablet    dexlansoprazole (Dexilant) 60 MG capsule    Other Relevant Orders    Ambulatory Referral to Gastroenterology    Heartburn    Overview     Added automatically from request for surgery [id number]         Relevant Medications    dexlansoprazole (Dexilant) 60 MG capsule    Other Relevant Orders    Ambulatory Referral to Gastroenterology       Genitourinary and Reproductive     Erectile dysfunction    Current Assessment & Plan     We will see if Dexilant is now affordable with change of insurance, if not continue Protonix 40 mg and follow-up with gastroenterology as referred to today         Relevant Medications    sildenafil (Viagra) 50 MG tablet       Pulmonary and Pneumonias    Allergic cough    Relevant Medications    montelukast (SINGULAIR) 10 MG tablet    levocetirizine (XYZAL)  "5 MG tablet    mometasone (NASONEX) 50 MCG/ACT nasal spray    albuterol sulfate  (90 Base) MCG/ACT inhaler    Seasonal asthma    Relevant Medications    montelukast (SINGULAIR) 10 MG tablet    levocetirizine (XYZAL) 5 MG tablet    mometasone (NASONEX) 50 MCG/ACT nasal spray    albuterol sulfate  (90 Base) MCG/ACT inhaler   Other Visit Diagnoses     Urine leukocytes        Relevant Orders    Urine Culture - Urine, Urine, Clean Catch        Orders Placed This Encounter   Procedures   • Urine Culture - Urine, Urine, Clean Catch   • Ambulatory Referral to Gastroenterology     New Medications Ordered This Visit   Medications   • dexlansoprazole (Dexilant) 60 MG capsule     Sig: Take 1 capsule by mouth Daily for 30 days.     Dispense:  30 capsule     Refill:  0   • sildenafil (Viagra) 50 MG tablet     Sig: Take 0.5 tablets by mouth Daily As Needed for Erectile Dysfunction.     Dispense:  30 tablet     Refill:  1   • levocetirizine (XYZAL) 5 MG tablet     Sig: Take 1 tablet by mouth Daily.     Dispense:  90 tablet     Refill:  3   • mometasone (NASONEX) 50 MCG/ACT nasal spray     Si sprays daily     Dispense:  17 g     Refill:  5   • albuterol sulfate  (90 Base) MCG/ACT inhaler     Sig: Inhale 1-2 puffs Every 4 (Four) Hours As Needed for Wheezing or Shortness of Air.     Dispense:  18 g     Refill:  1       SUMMARY/DISCUSSION    Next Appointment with me: Visit date not found    Return in about 6 months (around 10/4/2023) for Next scheduled follow up; 1 year annual physical .      VITAL SIGNS     Visit Vitals  /74   Pulse 101   Temp 97.3 °F (36.3 °C) (Temporal)   Ht 175.8 cm (69.2\")   Wt 101 kg (222 lb 6.4 oz)   SpO2 98%   BMI 32.65 kg/m²       Wt Readings from Last 3 Encounters:   23 101 kg (222 lb 6.4 oz)   22 99.6 kg (219 lb 9.6 oz)   22 104 kg (228 lb 6.4 oz)     Body mass index is 32.65 kg/m².      MEDICATIONS AT THE TIME OF OFFICE VISIT     Current Outpatient " Medications on File Prior to Visit   Medication Sig   • amLODIPine (NORVASC) 5 MG tablet TAKE 1 TABLET BY MOUTH EVERY DAY   • diclofenac (VOLTAREN) 1 % gel gel Apply 4 g topically to the appropriate area as directed 4 (Four) Times a Day As Needed (pain).   • hydroCHLOROthiazide (HYDRODIURIL) 25 MG tablet Take 0.5 tablets by mouth Daily.   • montelukast (SINGULAIR) 10 MG tablet Take 1 tablet by mouth every night at bedtime.   • pantoprazole (Protonix) 40 MG EC tablet Take 1 tablet by mouth Daily.   • Turmeric Curcumin 500 MG capsule Take 1,000 mg by mouth Daily.   • valsartan (DIOVAN) 320 MG tablet Take 1 tablet by mouth Daily.   • [DISCONTINUED] albuterol sulfate  (90 Base) MCG/ACT inhaler Inhale 1-2 puffs Every 4 (Four) Hours As Needed for Wheezing or Shortness of Air.   • [DISCONTINUED] levocetirizine (XYZAL) 5 MG tablet TAKE 1 TABLET BY MOUTH EVERY DAY   • [DISCONTINUED] sildenafil (Viagra) 50 MG tablet Take 0.5 tablets by mouth Daily As Needed for Erectile Dysfunction.   • [DISCONTINUED] mometasone (NASONEX) 50 MCG/ACT nasal spray ONE SPRAY AND EACH NOSTRIL TWICE DAILY (Patient not taking: Reported on 4/4/2023)     No current facility-administered medications on file prior to visit.          HISTORY OF PRESENT ILLNESS     Hypertension now well controlled with increasing hydrochlorothiazide from 12.5 to 25 mg daily. Continued on valsartan 320 and amlodipine 5 mg. Denies any chest pain, shortness of air, headache, visual disturbances, lower extremity leg swelling, or heart palpitations.      Seasonal allergies and asthma mildly uncontrolled as he is ran of his Singulair 10 mg daily.  Has albuterol as needed, Xyzal and Nasonex daily.  No wheezing or significant shortness of breath.       Espinal's esophagus without dysplasia, plans to have updated EGD with gastroenterology.  Currently transitioning physicians at [facility], new insurance, currently on Protonix 40 mg but Dexilant has worked better for him in the  past.  Would like to see if this is now covered with new insurance.    Has not rescheduled sleep study.  Would like to hold off until gastroenterology follow-up.     Hyperlipidemia with last , states that he has not been exercising as frequently.    Erectile dysfunction controlled with Viagra as needed, referred to urology for low end of normal testosterone levels but patient has not followed up at this time.    REVIEW OF SYSTEMS     Constitutional neg except per HPI   Resp neg  CV neg  GI GERD    PHYSICAL EXAMINATION     Physical Exam  Constitutional  No distress  Cardiovascular Rate  normal . Rhythm: regular . Heart sounds:  normal  Pulmonary/Chest  Effort normal. Breath sounds:  normal  Psychiatric  Alert. Judgment and thought content normal. Mood normal     REVIEWED DATA     Labs:   Lab Results   Component Value Date    GLUCOSE 93 03/27/2023    BUN 17 03/27/2023    CREATININE 1.18 03/27/2023    EGFRRESULT 73.3 03/27/2023    BCR 14.4 03/27/2023    K 4.0 03/27/2023    CO2 33.1 (H) 03/27/2023    CALCIUM 10.0 03/27/2023    PROTENTOTREF 6.8 03/27/2023    ALBUMIN 4.3 03/27/2023    BILITOT 0.5 03/27/2023    AST 18 03/27/2023    ALT 24 03/27/2023     Lab Results   Component Value Date    TSH 3.940 03/27/2023     Lab Results   Component Value Date    CHLPL 217 (H) 03/27/2023    TRIG 108 03/27/2023    HDL 55 03/27/2023     (H) 03/27/2023     Lab Results   Component Value Date    HGBA1C 5.30 03/27/2023     Lab Results   Component Value Date    WBC 5.36 03/27/2023    HGB 16.5 03/27/2023    HCT 47.2 03/27/2023    MCV 85.8 03/27/2023     03/27/2023     Brief Urine Lab Results  (Last result in the past 365 days)      Color   Clarity   Blood   Leuk Est   Nitrite   Protein   CREAT   Urine HCG        03/27/23 0856 Yellow  Comment: REFERENCE RANGE: Yellow, Straw   Clear   Negative   See below:  Comment: Small (1+)   Negative   Negative               Lab Results   Component Value Date    PSA 1.110 03/27/2023     PSA 1.360 08/30/2022    PSA 1.0 11/01/2021     Imaging:           Medical Tests:           Summary of old records / correspondence / consultant report:           Request outside records:           **Dragon dictation used for documentation.

## 2023-04-04 NOTE — ASSESSMENT & PLAN NOTE
Decrease/eliminate soda, caffeine, alcohol and overall caloric intake. Reduce carbohydrates and sweets in diet.  Continue to improve dietary habits with lean proteins, fresh vegetables, fruits, and nuts. Improve aerobic exercise: walking/biking/swimming daily as tolerated, recommend 30 minutes/day at least 5 days/week.    Discussed consideration of CT cardiac calcium score in the future which he will consider with family  history of heart disease

## 2023-04-05 LAB
BACTERIA UR CULT: NO GROWTH
BACTERIA UR CULT: NORMAL

## 2023-06-08 DIAGNOSIS — I10 ESSENTIAL HYPERTENSION: Primary | ICD-10-CM

## 2023-06-08 RX ORDER — AMLODIPINE BESYLATE 5 MG/1
TABLET ORAL
Qty: 90 TABLET | Refills: 3 | Status: SHIPPED | OUTPATIENT
Start: 2023-06-08

## 2023-07-24 DIAGNOSIS — I10 ESSENTIAL HYPERTENSION: ICD-10-CM

## 2023-07-24 RX ORDER — HYDROCHLOROTHIAZIDE 25 MG/1
TABLET ORAL
Qty: 90 TABLET | Refills: 3 | Status: SHIPPED | OUTPATIENT
Start: 2023-07-24

## 2023-07-24 RX ORDER — VALSARTAN 320 MG/1
TABLET ORAL
Qty: 90 TABLET | Refills: 3 | Status: SHIPPED | OUTPATIENT
Start: 2023-07-24

## 2023-08-07 ENCOUNTER — OFFICE VISIT (OUTPATIENT)
Dept: GASTROENTEROLOGY | Facility: CLINIC | Age: 53
End: 2023-08-07
Payer: COMMERCIAL

## 2023-08-07 VITALS
WEIGHT: 227.2 LBS | DIASTOLIC BLOOD PRESSURE: 93 MMHG | OXYGEN SATURATION: 97 % | SYSTOLIC BLOOD PRESSURE: 149 MMHG | BODY MASS INDEX: 33.65 KG/M2 | HEART RATE: 81 BPM | HEIGHT: 69 IN | TEMPERATURE: 98.6 F

## 2023-08-07 DIAGNOSIS — R05.9 COUGH, UNSPECIFIED TYPE: Primary | ICD-10-CM

## 2023-08-07 DIAGNOSIS — K21.9 GASTROESOPHAGEAL REFLUX DISEASE, UNSPECIFIED WHETHER ESOPHAGITIS PRESENT: ICD-10-CM

## 2023-08-07 DIAGNOSIS — K22.70 BARRETT'S ESOPHAGUS WITHOUT DYSPLASIA: ICD-10-CM

## 2023-08-07 PROCEDURE — 99214 OFFICE O/P EST MOD 30 MIN: CPT | Performed by: NURSE PRACTITIONER

## 2023-08-07 NOTE — PROGRESS NOTES
Chief Complaint   Patient presents with    Heartburn       HPI    Areil Portillo is a  52 y.o. male here for a follow up visit for heartburn.    This patient follows with Dr. Kapoor, new to me.    Last medical history of Espinal's esophagus, hypertension and esophageal dilation.    He was last seen by Dr. Kapoor in 2021 with plans for EGD with pH testing secondary to cough and sore throat coupled with heartburn on Dexilant however patient canceled.    On visit today he has the same complaints that he was seen for in 2021.  Continues to have cough and occasional sore throat.  Insurance has denied Dexilant therefore he is taking Protonix 40 mg once daily and supplementing with over-the-counter antacids.  Denies dysphagia, odynophagia, nausea, vomiting, weight loss or poor appetite.    No lower GI complaints.    Additional data reviewed:    EGD 2020 w/ Irregular Z-line, small hiatal hernia, erythematous mucosa in the antrum, gastric polyp. Path + Espinal's.  C-scope 2020 w/ normal findings recall 10 years.    Past Medical History:   Diagnosis Date    Allergic     Espinal esophagus     Esophageal dilatation     GERD (gastroesophageal reflux disease)     Hypertension        Past Surgical History:   Procedure Laterality Date    COLONOSCOPY  2013    COLONOSCOPY N/A 01/17/2020    Procedure: COLONOSCOPY into cecum;  Surgeon: Isaac Vásquez MD;  Location: [facility] ENDOSCOPY;  Service: Gastroenterology    ENDOSCOPY  2013    ENDOSCOPY N/A 01/17/2020    Procedure: ESOPHAGOGASTRODUODENOSCOPY with biopsies;  Surgeon: Isaac Vásquez MD;  Location: [facility] ENDOSCOPY;  Service: Gastroenterology    ENDOSCOPY AND COLONOSCOPY      TEETH EXTRACTION Right 2020    UPPER GASTROINTESTINAL ENDOSCOPY         Scheduled Meds:     Continuous Infusions: No current facility-administered medications for this visit.      PRN Meds:     No Known Allergies    Social History     Socioeconomic History    Marital status: Single   Tobacco Use    Smoking  status: Never    Smokeless tobacco: Never   Vaping Use    Vaping Use: Never used   Substance and Sexual Activity    Alcohol use: Yes     Alcohol/week: 2.0 standard drinks     Types: 2 Cans of beer per week     Comment: Occasional     Drug use: No    Sexual activity: Yes     Partners: Female     Birth control/protection: None       Family History   Problem Relation Age of Onset    Heart disease Mother     Heart disease Father     Other Sister     Thyroid disease Sister     Colon cancer Paternal Grandmother        Review of Systems   HENT:  Negative for trouble swallowing.    Respiratory:  Positive for cough.    Gastrointestinal: Negative.      Vitals:    08/07/23 0910   BP: 149/93   Pulse: 81   Temp: 98.6 øF (37 øC)   SpO2: 97%       Physical Exam  Constitutional:       Appearance: He is well-developed.   Abdominal:      General: Bowel sounds are normal. There is no distension.      Palpations: Abdomen is soft. There is no mass.      Tenderness: There is no abdominal tenderness. There is no guarding.      Hernia: No hernia is present.   Skin:     General: Skin is warm and dry.      Capillary Refill: Capillary refill takes less than 2 seconds.   Neurological:      Mental Status: He is alert and oriented to person, place, and time.   Psychiatric:         Behavior: Behavior normal.     Assessment    Diagnoses and all orders for this visit:    1. Cough, unspecified type (Primary)  -     Case Request; Standing  -     Obtain Informed Consent; Standing  -     Case Request    2. Gastroesophageal reflux disease, unspecified whether esophagitis present  -     Case Request; Standing  -     Obtain Informed Consent; Standing  -     Case Request    3. Espinal's esophagus without dysplasia  -     Case Request; Standing  -     Obtain Informed Consent; Standing  -     Case Request       Plan    EGD with [facility] to be performed Dr. Kapoor  Antireflux measures and dietary modifications reviewed. Low acid diet reviewed. Keep head of bed  elevated. Stop eating/drinking at least 3 hours prior to bedtime. Eliminate caffeine and carbonated beverages.  Weight loss encouraged if BMI over 25.  Pending endoscopic findings consider referral to ENT  We will see if we can get Dexilant covered on his insurance message sent to pharmacist  Follow-up and further recommendations pending endoscopic findings         GLENN Wolfe  [facility]  [address] [place] [address]  Office: [phone]

## 2023-08-10 ENCOUNTER — TELEPHONE (OUTPATIENT)
Dept: GASTROENTEROLOGY | Facility: CLINIC | Age: 53
End: 2023-08-10
Payer: COMMERCIAL

## 2023-08-10 DIAGNOSIS — K21.9 GASTROESOPHAGEAL REFLUX DISEASE, UNSPECIFIED WHETHER ESOPHAGITIS PRESENT: Primary | ICD-10-CM

## 2023-08-10 NOTE — TELEPHONE ENCOUNTER
----- Message from GLENN Wolfe sent at 8/8/2023  3:11 PM EDT -----  Regarding: PPI  Please inform the patient our pharmacist reviewed his drug plan and Dexilant will only be approved if he has tried and failed 3 of the following for medications: Omeprazole, esomeprazole, lansoprazole, rabeprazole and pantoprazole.  Can we run this list of antacids with the patient and determine what he is tried and failed over the last several years.  Thanks Haley     ----- Message -----  From: Lesly Boyce, PharmD  Sent: 8/7/2023   2:19 PM EDT  To: GLENN Wolfe    It looks like generic Dexilant is a plan exclusion and the preferred PPIs are omeprazole, esomeprazole, lansoprazole, pantoprazole, and rabeprazole. If he's failed 3, they might approve it on appeal but they usually have to fail at least 3 when there are 4 or more formulary alternatives  ----- Message -----  From: Haley Lopez APRN  Sent: 8/7/2023   9:38 AM EDT  To: Lesly Boyce PharmD    Could you help me figure out why Dexilant is not approved on this patient's insurance?

## 2023-08-11 RX ORDER — PANTOPRAZOLE SODIUM 40 MG/1
40 TABLET, DELAYED RELEASE ORAL 2 TIMES DAILY
Qty: 60 TABLET | Refills: 5 | Status: SHIPPED | OUTPATIENT
Start: 2023-08-11

## 2023-08-11 NOTE — ADDENDUM NOTE
Added to MA drawer for appt on 4/27/23.   Addended by: ARCHIE SEWELL on: 8/11/2023 04:05 PM     Modules accepted: Orders

## 2023-08-11 NOTE — TELEPHONE ENCOUNTER
Patient called. Advised as per Haley's note. He verb understanding and is in agreement with the plan.     Pantoprazole 40 mg one tablet twice a day #60 5 refills. E-scribed to patient's [facility] pharmacy.

## 2023-08-11 NOTE — TELEPHONE ENCOUNTER
Per Haley:     Tell him unfortunately Dexilant is not covered on his insurance.  We could have him take Protonix 40 mg p.o. twice daily and await endoscopic findings if he is agreeable.

## 2023-09-08 DIAGNOSIS — R05.8 ALLERGIC COUGH: ICD-10-CM

## 2023-09-08 DIAGNOSIS — J45.998 SEASONAL ASTHMA: ICD-10-CM

## 2023-09-08 RX ORDER — MONTELUKAST SODIUM 10 MG/1
TABLET ORAL
Qty: 90 TABLET | Refills: 1 | Status: SHIPPED | OUTPATIENT
Start: 2023-09-08

## 2023-10-09 ENCOUNTER — OFFICE VISIT (OUTPATIENT)
Dept: INTERNAL MEDICINE | Age: 53
End: 2023-10-09
Payer: COMMERCIAL

## 2023-10-09 VITALS
DIASTOLIC BLOOD PRESSURE: 86 MMHG | WEIGHT: 225.4 LBS | TEMPERATURE: 98.4 F | BODY MASS INDEX: 33.38 KG/M2 | SYSTOLIC BLOOD PRESSURE: 132 MMHG | OXYGEN SATURATION: 98 % | HEIGHT: 69 IN | HEART RATE: 86 BPM

## 2023-10-09 DIAGNOSIS — Z23 NEED FOR INFLUENZA VACCINATION: ICD-10-CM

## 2023-10-09 DIAGNOSIS — J45.998 SEASONAL ASTHMA: ICD-10-CM

## 2023-10-09 DIAGNOSIS — E78.5 HYPERLIPIDEMIA, UNSPECIFIED HYPERLIPIDEMIA TYPE: Primary | ICD-10-CM

## 2023-10-09 DIAGNOSIS — N52.9 ERECTILE DYSFUNCTION, UNSPECIFIED ERECTILE DYSFUNCTION TYPE: ICD-10-CM

## 2023-10-09 DIAGNOSIS — I10 ESSENTIAL HYPERTENSION: ICD-10-CM

## 2023-10-09 DIAGNOSIS — Z11.59 NEED FOR HEPATITIS C SCREENING TEST: ICD-10-CM

## 2023-10-09 DIAGNOSIS — R05.8 ALLERGIC COUGH: ICD-10-CM

## 2023-10-09 PROCEDURE — 99214 OFFICE O/P EST MOD 30 MIN: CPT | Performed by: NURSE PRACTITIONER

## 2023-10-09 NOTE — PROGRESS NOTES
I N T E R N A L  M E D I C I N E  CLEVE ROCHA APRDEWAYNE      ENCOUNTER DATE:  10/09/2023    Ariel Portillo / 52 y.o. / male      CHIEF COMPLAINT / REASON FOR OFFICE VISIT     Hyperlipidemia and Hypertension      ASSESSMENT & PLAN     Problem List Items Addressed This Visit          Cardiac and Vasculature    Essential hypertension    Relevant Medications    amLODIPine (NORVASC) 5 MG tablet    hydroCHLOROthiazide (HYDRODIURIL) 25 MG tablet    valsartan (DIOVAN) 320 MG tablet    Other Relevant Orders    Comprehensive Metabolic Panel    Hyperlipidemia - Primary    Relevant Orders    Lipid Panel With / Chol / HDL Ratio       Genitourinary and Reproductive     Erectile dysfunction    Relevant Medications    sildenafil (Viagra) 50 MG tablet    Other Relevant Orders    Estrogens, Total    Testosterone, Free, Total       Pulmonary and Pneumonias    Allergic cough    Relevant Medications    levocetirizine (XYZAL) 5 MG tablet    mometasone (NASONEX) 50 MCG/ACT nasal spray    albuterol sulfate  (90 Base) MCG/ACT inhaler    montelukast (SINGULAIR) 10 MG tablet    Seasonal asthma    Relevant Medications    levocetirizine (XYZAL) 5 MG tablet    mometasone (NASONEX) 50 MCG/ACT nasal spray    albuterol sulfate  (90 Base) MCG/ACT inhaler    montelukast (SINGULAIR) 10 MG tablet     Other Visit Diagnoses       Need for hepatitis C screening test        Relevant Orders    Hepatitis C antibody    Need for influenza vaccination        Relevant Orders    Fluzone (or Fluarix & Flulaval for VFC) >6 Mos (2900-7382)          Orders Placed This Encounter   Procedures    Fluzone (or Fluarix & Flulaval for VFC) >6 Mos (5965-1834)    Estrogens, Total    Testosterone, Free, Total    Comprehensive Metabolic Panel    Lipid Panel With / Chol / HDL Ratio    Hepatitis C antibody     No orders of the defined types were placed in this encounter.      SUMMARY/DISCUSSION  Patient would like to have a cystoscopy and testosterone levels  "checked which we will perform today.  Otherwise he will continue all chronic medical medications.  Administer influenza shot today.  Discussed for cholesterol if he would like to incorporate a more holistic approach to medication treatment he could include red yeast rice to regimen.  Discussed potential for CT cardiac calcium score with father's family history of heart attack.  He would like to hold off for now.    Next Appointment with me: Visit date not found    Return for Next scheduled follow up.      VITAL SIGNS     Visit Vitals  /86 (BP Location: Left arm, Patient Position: Sitting, Cuff Size: Large Adult)   Pulse 86   Temp 98.4 øF (36.9 øC) (Temporal)   Ht 175.3 cm (69\")   Wt 102 kg (225 lb 6.4 oz)   SpO2 98%   BMI 33.29 kg/mý       Wt Readings from Last 3 Encounters:   10/09/23 102 kg (225 lb 6.4 oz)   08/07/23 103 kg (227 lb 3.2 oz)   04/04/23 101 kg (222 lb 6.4 oz)     Body mass index is 33.29 kg/mý.      MEDICATIONS AT THE TIME OF OFFICE VISIT     Current Outpatient Medications on File Prior to Visit   Medication Sig    albuterol sulfate  (90 Base) MCG/ACT inhaler Inhale 1-2 puffs Every 4 (Four) Hours As Needed for Wheezing or Shortness of Air.    amLODIPine (NORVASC) 5 MG tablet TAKE 1 TABLET BY MOUTH EVERY DAY    diclofenac (VOLTAREN) 1 % gel gel Apply 4 g topically to the appropriate area as directed 4 (Four) Times a Day As Needed (pain).    hydroCHLOROthiazide (HYDRODIURIL) 25 MG tablet TAKE 1 TABLET BY MOUTH EVERY DAY    levocetirizine (XYZAL) 5 MG tablet Take 1 tablet by mouth Daily.    mometasone (NASONEX) 50 MCG/ACT nasal spray 2 sprays daily    montelukast (SINGULAIR) 10 MG tablet TAKE 1 TABLET BY MOUTH EVERYDAY AT BEDTIME    pantoprazole (PROTONIX) 40 MG EC tablet Take 1 tablet by mouth 2 (Two) Times a Day.    sildenafil (Viagra) 50 MG tablet Take 0.5 tablets by mouth Daily As Needed for Erectile Dysfunction.    Turmeric Curcumin 500 MG capsule Take 1,000 mg by mouth Daily.    " valsartan (DIOVAN) 320 MG tablet TAKE 1 TABLET BY MOUTH EVERY DAY     No current facility-administered medications on file prior to visit.      HISTORY OF PRESENT ILLNESS     Hypertension overall controlled with hydrochlorothiazide 25 mg daily, valsartan 320 and amlodipine 5 mg.  He denies any chest pain shortness of breath, headache, visual disturbances, lower extremities leg swelling or heart palpitations.      Seasonal allergies and asthma overall controlled with Singulair 10 mg daily, albuterol as needed along with daily Xyzal and Nasonex.  No wheezing or shortness of breath.    Hyperlipidemia with last LDL around 143.  Family history of heart disease.  No medication treatment at this time.  Would like to try over-the-counter supplement first.    Erectile dysfunction controlled with Viagra as needed.  He has been referred to urology in the past with limited normal testosterone levels but has not followed up.    Espinal's esophagus, on PPI, updated EGD scheduled.    REVIEW OF SYSTEMS     Constitutional neg except per HPI   Resp neg  CV neg     PHYSICAL EXAMINATION     Physical Exam  Constitutional  No distress  Cardiovascular Rate  normal . Rhythm: regular . Heart sounds:  normal  Pulmonary/Chest  Effort normal. Breath sounds:  normal  Psychiatric  Alert. Judgment and thought content normal. Mood normal      REVIEWED DATA     Labs:   Lab Results   Component Value Date    GLUCOSE 93 03/27/2023    BUN 17 03/27/2023    CREATININE 1.18 03/27/2023    EGFRRESULT 73.3 03/27/2023    BCR 14.4 03/27/2023    K 4.0 03/27/2023    CO2 33.1 (H) 03/27/2023    CALCIUM 10.0 03/27/2023    PROTENTOTREF 6.8 03/27/2023    ALBUMIN 4.3 03/27/2023    BILITOT 0.5 03/27/2023    AST 18 03/27/2023    ALT 24 03/27/2023     Lab Results   Component Value Date    TSH 3.940 03/27/2023     Lab Results   Component Value Date    WBC 5.36 03/27/2023    HGB 16.5 03/27/2023    HCT 47.2 03/27/2023    MCV 85.8 03/27/2023     03/27/2023     Lab  Results   Component Value Date    CHLPL 217 (H) 03/27/2023    TRIG 108 03/27/2023    HDL 55 03/27/2023     (H) 03/27/2023     Lab Results   Component Value Date    HGBA1C 5.30 03/27/2023     Brief Urine Lab Results  (Last result in the past 365 days)        Color   Clarity   Blood   Leuk Est   Nitrite   Protein   CREAT   Urine HCG        03/27/23 0856 Yellow  Comment: REFERENCE RANGE: Yellow, Straw   Clear   Negative   See below:  Comment: Small (1+)   Negative   Negative                       Imaging:           Medical Tests:           Summary of old records / correspondence / consultant report:           Request outside records:             *Dragon dictation used for documentation.

## 2023-10-09 NOTE — LETTER
"[facility]  Vaccine Consent Form    Patient Name:  Ariel Portillo  Patient :  1970     Vaccine(s) Ordered    Fluzone (or Fluarix & Flulaval for VFC) >6 Mos (9110-5013)        Screening Checklist  The following questions should be completed prior to vaccination. If you answer "yes" to any question, it does not necessarily mean you should not be vaccinated. It just means we may need to clarify or ask more questions. If a question is unclear, please ask your healthcare provider to explain it.    Yes No   Any fever or moderate to severe illness today (mild illness and/or antibiotic treatment are not contraindications)?     Do you have a history of a serious reaction to any previous vaccinations, such as anaphylaxis, encephalopathy within 7 days, Guillain-[place] syndrome within 6 weeks, seizure?     Have you received any live vaccine(s) in the past month (MMR, YULISA)?     Do you have an anaphylactic allergy to latex (DTaP, DTaP-IPV, Hep A, Hep B, MenB, RV, Td, Tdap), baker's yeast (Hep B, HPV), or gelatin (YULISA, MMR)?     Do you have an anaphylactic allergy to neomycin (Rabies, YULISA, MMR, IPV, Hep A), polymyxin B (IPV), or streptomycin (IPV)?      Any cancer, leukemia, AIDS, or other immune system disorder? (YULISA, MMR, RV)     Do you have a parent, brother, or sister with an immune system problem (if immune competence of vaccine recipient clinically verified, can proceed)? (MMR, YULISA)     Any recent steroid treatments for >2 weeks, chemotherapy, or radiation treatment? (YULISA, MMR)     Have you received antibody-containing blood transfusions or IVIG in the past 11 months (recommended interval is dependent on product)? (MMR, YULISA)     Have you taken antiviral drugs (acyclovir, famciclovir, valacyclovir) in the last 24 or 48 hours, respectively (YULISA)?      Are you pregnant or planning to become pregnant within 1 month? (YULISA, MMR, HPV, IPV, MenB; For hep B- refer to Engerix-B)     For infants, have you ever been told " "your child has had intussusception or a medical emergency involving obstruction of the intestine (RV)? If not for an infant, can skip this question.         *Ordering Physician/APC should be consulted if "yes" is checked by the patient or guardian above.      I have received, read, and understand the Vaccine Information Statement (VIS) for each vaccine ordered above.  I have considered my health status as well as the health status of my close contacts.  I have taken the opportunity to discuss my vaccine questions with my health care provider.   I have requested that the ordered vaccine(s) be given to me.  I understand the benefits and risks of the vaccines.  I understand that I should remain in the clinic for 15 minutes after receiving the vaccine(s).  _________________________________________________________  Signature of Patient or Parent/Legal Guardian ____________________  Date     "

## 2023-10-14 LAB
ALBUMIN SERPL-MCNC: 4.9 G/DL (ref 3.5–5.2)
ALBUMIN/GLOB SERPL: 2.3 G/DL
ALP SERPL-CCNC: 60 U/L (ref 39–117)
ALT SERPL-CCNC: 36 U/L (ref 1–41)
AST SERPL-CCNC: 34 U/L (ref 1–40)
BILIRUB SERPL-MCNC: 0.6 MG/DL (ref 0–1.2)
BUN SERPL-MCNC: 24 MG/DL (ref 6–20)
BUN/CREAT SERPL: 20.7 (ref 7–25)
CALCIUM SERPL-MCNC: 9.8 MG/DL (ref 8.6–10.5)
CHLORIDE SERPL-SCNC: 101 MMOL/L (ref 98–107)
CHOLEST SERPL-MCNC: 208 MG/DL (ref 0–200)
CHOLEST/HDLC SERPL: 4 {RATIO}
CO2 SERPL-SCNC: 27.2 MMOL/L (ref 22–29)
CREAT SERPL-MCNC: 1.16 MG/DL (ref 0.76–1.27)
EGFRCR SERPLBLD CKD-EPI 2021: 74.8 ML/MIN/1.73
ESTROGEN SERPL-MCNC: 85 PG/ML (ref 56–213)
GLOBULIN SER CALC-MCNC: 2.1 GM/DL
GLUCOSE SERPL-MCNC: 105 MG/DL (ref 65–99)
HCV IGG SERPL QL IA: NON REACTIVE
HDLC SERPL-MCNC: 52 MG/DL (ref 40–60)
LDLC SERPL CALC-MCNC: 143 MG/DL (ref 0–100)
POTASSIUM SERPL-SCNC: 4.4 MMOL/L (ref 3.5–5.2)
PROT SERPL-MCNC: 7 G/DL (ref 6–8.5)
SODIUM SERPL-SCNC: 140 MMOL/L (ref 136–145)
TESTOST FREE SERPL-MCNC: 5 PG/ML (ref 7.2–24)
TESTOST SERPL-MCNC: 409 NG/DL (ref 264–916)
TRIGL SERPL-MCNC: 74 MG/DL (ref 0–150)
VLDLC SERPL CALC-MCNC: 13 MG/DL (ref 5–40)

## 2023-10-30 ENCOUNTER — TELEPHONE (OUTPATIENT)
Dept: GASTROENTEROLOGY | Facility: CLINIC | Age: 53
End: 2023-10-30
Payer: COMMERCIAL

## 2023-11-02 PROBLEM — R05.9 COUGH: Status: ACTIVE | Noted: 2023-08-07

## 2023-11-03 ENCOUNTER — ANESTHESIA EVENT (OUTPATIENT)
Dept: GASTROENTEROLOGY | Facility: HOSPITAL | Age: 53
End: 2023-11-03
Payer: COMMERCIAL

## 2023-11-03 ENCOUNTER — HOSPITAL ENCOUNTER (OUTPATIENT)
Facility: HOSPITAL | Age: 53
Setting detail: HOSPITAL OUTPATIENT SURGERY
Discharge: HOME | End: 2023-11-03
Attending: INTERNAL MEDICINE | Admitting: INTERNAL MEDICINE
Payer: COMMERCIAL

## 2023-11-03 ENCOUNTER — ANESTHESIA (OUTPATIENT)
Dept: GASTROENTEROLOGY | Facility: HOSPITAL | Age: 53
End: 2023-11-03
Payer: COMMERCIAL

## 2023-11-03 VITALS
TEMPERATURE: 98 F | RESPIRATION RATE: 16 BRPM | HEART RATE: 78 BPM | HEIGHT: 69 IN | SYSTOLIC BLOOD PRESSURE: 112 MMHG | WEIGHT: 231 LBS | BODY MASS INDEX: 34.21 KG/M2 | OXYGEN SATURATION: 95 % | DIASTOLIC BLOOD PRESSURE: 79 MMHG

## 2023-11-03 DIAGNOSIS — R05.9 COUGH, UNSPECIFIED TYPE: ICD-10-CM

## 2023-11-03 DIAGNOSIS — K22.70 BARRETT'S ESOPHAGUS WITHOUT DYSPLASIA: ICD-10-CM

## 2023-11-03 DIAGNOSIS — K21.9 GASTROESOPHAGEAL REFLUX DISEASE, UNSPECIFIED WHETHER ESOPHAGITIS PRESENT: ICD-10-CM

## 2023-11-03 PROCEDURE — 25810000003 LACTATED RINGERS PER 1000 ML: Performed by: INTERNAL MEDICINE

## 2023-11-03 PROCEDURE — 25810000003 LACTATED RINGERS PER 1000 ML: Performed by: ANESTHESIOLOGY

## 2023-11-03 PROCEDURE — S0260 H&P FOR SURGERY: HCPCS | Performed by: INTERNAL MEDICINE

## 2023-11-03 PROCEDURE — 43235 EGD DIAGNOSTIC BRUSH WASH: CPT | Performed by: INTERNAL MEDICINE

## 2023-11-03 PROCEDURE — 25010000002 PROPOFOL 10 MG/ML EMULSION: Performed by: ANESTHESIOLOGY

## 2023-11-03 RX ORDER — PROPOFOL 10 MG/ML
VIAL (ML) INTRAVENOUS AS NEEDED
Status: DISCONTINUED | OUTPATIENT
Start: 2023-11-03 | End: 2023-11-03 | Stop reason: SURG

## 2023-11-03 RX ORDER — SODIUM CHLORIDE, SODIUM LACTATE, POTASSIUM CHLORIDE, CALCIUM CHLORIDE 600; 310; 30; 20 MG/100ML; MG/100ML; MG/100ML; MG/100ML
30 INJECTION, SOLUTION INTRAVENOUS CONTINUOUS PRN
Status: DISCONTINUED | OUTPATIENT
Start: 2023-11-03 | End: 2023-11-03 | Stop reason: HOSPADM

## 2023-11-03 RX ORDER — LIDOCAINE HYDROCHLORIDE 20 MG/ML
INJECTION, SOLUTION INFILTRATION; PERINEURAL AS NEEDED
Status: DISCONTINUED | OUTPATIENT
Start: 2023-11-03 | End: 2023-11-03 | Stop reason: SURG

## 2023-11-03 RX ORDER — SODIUM CHLORIDE 0.9 % (FLUSH) 0.9 %
10 SYRINGE (ML) INJECTION EVERY 12 HOURS SCHEDULED
Status: DISCONTINUED | OUTPATIENT
Start: 2023-11-03 | End: 2023-11-03 | Stop reason: HOSPADM

## 2023-11-03 RX ORDER — SODIUM CHLORIDE 0.9 % (FLUSH) 0.9 %
10 SYRINGE (ML) INJECTION AS NEEDED
Status: DISCONTINUED | OUTPATIENT
Start: 2023-11-03 | End: 2023-11-03 | Stop reason: HOSPADM

## 2023-11-03 RX ORDER — SODIUM CHLORIDE, SODIUM LACTATE, POTASSIUM CHLORIDE, CALCIUM CHLORIDE 600; 310; 30; 20 MG/100ML; MG/100ML; MG/100ML; MG/100ML
INJECTION, SOLUTION INTRAVENOUS CONTINUOUS PRN
Status: DISCONTINUED | OUTPATIENT
Start: 2023-11-03 | End: 2023-11-03 | Stop reason: SURG

## 2023-11-03 RX ORDER — SODIUM CHLORIDE 9 MG/ML
40 INJECTION, SOLUTION INTRAVENOUS AS NEEDED
Status: DISCONTINUED | OUTPATIENT
Start: 2023-11-03 | End: 2023-11-03 | Stop reason: HOSPADM

## 2023-11-03 RX ADMIN — SODIUM CHLORIDE, POTASSIUM CHLORIDE, SODIUM LACTATE AND CALCIUM CHLORIDE: 600; 310; 30; 20 INJECTION, SOLUTION INTRAVENOUS at 10:52

## 2023-11-03 RX ADMIN — PROPOFOL 100 MG: 10 INJECTION, EMULSION INTRAVENOUS at 11:02

## 2023-11-03 RX ADMIN — SODIUM CHLORIDE, POTASSIUM CHLORIDE, SODIUM LACTATE AND CALCIUM CHLORIDE 30 ML/HR: 600; 310; 30; 20 INJECTION, SOLUTION INTRAVENOUS at 10:08

## 2023-11-03 RX ADMIN — LIDOCAINE HYDROCHLORIDE 60 MG: 20 INJECTION, SOLUTION INFILTRATION; PERINEURAL at 11:02

## 2023-11-03 RX ADMIN — PROPOFOL 300 MCG/KG/MIN: 10 INJECTION, EMULSION INTRAVENOUS at 11:02

## 2023-11-03 NOTE — H&P
[facility]  Pre Procedure History & Physical    Chief Complaint:   Time for my egd     Subjective     HPI:   52 y.o. male presenting to endoscopy unit todayfor egd     Past Medical History:   Past Medical History:   Diagnosis Date    Allergic     Espinal esophagus     Esophageal dilatation     GERD (gastroesophageal reflux disease)     Hypertension        Family History:  Family History   Problem Relation Age of Onset    Heart disease Mother     Heart disease Father     Other Sister     Thyroid disease Sister     Colon cancer Paternal Grandmother     Malig Hyperthermia Neg Hx        Social History:   reports that he has never smoked. He has never used smokeless tobacco. He reports current alcohol use of about 2.0 standard drinks of alcohol per week. He reports that he does not use drugs.    Medications:   Medications Prior to Admission   Medication Sig Dispense Refill Last Dose    amLODIPine (NORVASC) 5 MG tablet TAKE 1 TABLET BY MOUTH EVERY DAY 90 tablet 3 11/2/2023    hydroCHLOROthiazide (HYDRODIURIL) 25 MG tablet TAKE 1 TABLET BY MOUTH EVERY DAY 90 tablet 3 11/2/2023    levocetirizine (XYZAL) 5 MG tablet Take 1 tablet by mouth Daily. 90 tablet 3 Past Week    mometasone (NASONEX) 50 MCG/ACT nasal spray 2 sprays daily 17 g 5 Past Week    montelukast (SINGULAIR) 10 MG tablet TAKE 1 TABLET BY MOUTH EVERYDAY AT BEDTIME 90 tablet 1 Past Month    pantoprazole (PROTONIX) 40 MG EC tablet Take 1 tablet by mouth 2 (Two) Times a Day. 60 tablet 5 Past Week    Turmeric Curcumin 500 MG capsule Take 1,000 mg by mouth Daily.   Past Week    valsartan (DIOVAN) 320 MG tablet TAKE 1 TABLET BY MOUTH EVERY DAY 90 tablet 3 11/2/2023    albuterol sulfate  (90 Base) MCG/ACT inhaler Inhale 1-2 puffs Every 4 (Four) Hours As Needed for Wheezing or Shortness of Air. 18 g 1 More than a month    diclofenac (VOLTAREN) 1 % gel gel Apply 4 g topically to the appropriate area as directed 4 (Four) Times a Day As  "Needed (pain).   More than a month    sildenafil (Viagra) 50 MG tablet Take 0.5 tablets by mouth Daily As Needed for Erectile Dysfunction. 30 tablet 1 More than a month       Allergies:  Patient has no known allergies.    Objective     Blood pressure 131/69, pulse 86, temperature 98 °F (36.7 °C), temperature source Oral, resp. rate 16, height 175.3 cm (69\"), weight 105 kg (231 lb), SpO2 96%.  Physical Exam:   General: patient awake, alert and cooperative    Assessment & Plan     Diagnosis:  gerd    Anticipated Surgical Procedure:  egd    The risks, benefits, and alternatives of this procedure have been discussed with the patient or the responsible party- the patient understands and agrees to proceed.                                                                 "

## 2023-11-03 NOTE — ANESTHESIA POSTPROCEDURE EVALUATION
"Patient: Ariel Portillo    Procedure Summary       Date: 11/03/23 Room / Location:  [facility] /  [facility]    Anesthesia Start: 1054 Anesthesia Stop: 1113    Procedure: ESOPHAGOGASTRODUODENOSCOPY AND BRAVO (Esophagus) Diagnosis:       Gastroesophageal reflux disease, unspecified whether esophagitis present      Cough, unspecified type      Espinal's esophagus without dysplasia      (Gastroesophageal reflux disease, unspecified whether esophagitis present [K21.9])      (Cough, unspecified type [R05.9])      (Espinal's esophagus without dysplasia [K22.70])    Surgeons: Alfonso Kapoor MD Provider: Nilesh Angel MD    Anesthesia Type: MAC ASA Status: 3            Anesthesia Type: MAC    Vitals  Vitals Value Taken Time   /79 11/03/23 1133   Temp     Pulse 77 11/03/23 1135   Resp 16 11/03/23 1133   SpO2 96 % 11/03/23 1135   Vitals shown include unfiled device data.        Post Anesthesia Care and Evaluation    Patient location during evaluation: bedside  Patient participation: complete - patient participated  Level of consciousness: sleepy but conscious  Pain score: 0  Pain management: adequate    Airway patency: patent  Anesthetic complications: No anesthetic complications    Cardiovascular status: acceptable  Respiratory status: acceptable  Hydration status: acceptable    Comments: /79 (BP Location: Left arm, Patient Position: Lying)   Pulse 78   Temp 36.7 °C (98 °F) (Oral)   Resp 16   Ht 175.3 cm (69\")   Wt 105 kg (231 lb)   SpO2 95%   BMI 34.11 kg/m²       "

## 2023-11-03 NOTE — ANESTHESIA PREPROCEDURE EVALUATION
Anesthesia Evaluation     NPO Solid Status: > 8 hours             Airway   Mallampati: III  TM distance: >3 FB  Dental      Pulmonary    (-) wheezes  Cardiovascular     Rhythm: regular    (+) hypertension, hyperlipidemia      Neuro/Psych  GI/Hepatic/Renal/Endo    (+) GERD    Musculoskeletal     Abdominal    Substance History      OB/GYN          Other                    Anesthesia Plan    ASA 3     MAC     (D/W pt. MAC and possible awareness intra op.  Pt understands MAC and GA are not the same and the possibility of GA being required for failed MAC)    Anesthetic plan, risks, benefits, and alternatives have been provided, discussed and informed consent has been obtained with: patient.    CODE STATUS:

## 2023-11-03 NOTE — DISCHARGE INSTRUCTIONS

## 2023-11-14 ENCOUNTER — TELEPHONE (OUTPATIENT)
Dept: GASTROENTEROLOGY | Facility: CLINIC | Age: 53
End: 2023-11-14
Payer: COMMERCIAL

## 2023-11-30 ENCOUNTER — TELEPHONE (OUTPATIENT)
Dept: GASTROENTEROLOGY | Facility: CLINIC | Age: 53
End: 2023-11-30
Payer: COMMERCIAL

## 2023-11-30 NOTE — TELEPHONE ENCOUNTER
Please review records from [facility] scanned under media in the patient chart from 11/28/2023.

## 2024-02-10 DIAGNOSIS — K21.9 GASTROESOPHAGEAL REFLUX DISEASE, UNSPECIFIED WHETHER ESOPHAGITIS PRESENT: ICD-10-CM

## 2024-02-12 RX ORDER — PANTOPRAZOLE SODIUM 40 MG/1
40 TABLET, DELAYED RELEASE ORAL 2 TIMES DAILY
Qty: 180 TABLET | Refills: 1 | Status: SHIPPED | OUTPATIENT
Start: 2024-02-12

## 2024-09-13 RX ORDER — HYDROCHLOROTHIAZIDE 25 MG/1
TABLET ORAL
Qty: 90 TABLET | Refills: 0 | Status: SHIPPED | OUTPATIENT
Start: 2024-09-13

## 2024-09-16 DIAGNOSIS — I10 ESSENTIAL HYPERTENSION: ICD-10-CM

## 2024-09-16 RX ORDER — VALSARTAN 320 MG/1
320 TABLET ORAL DAILY
Qty: 90 TABLET | Refills: 1 | Status: SHIPPED | OUTPATIENT
Start: 2024-09-16

## 2024-12-30 ENCOUNTER — OFFICE VISIT (OUTPATIENT)
Dept: FAMILY MEDICINE CLINIC | Facility: CLINIC | Age: 54
End: 2024-12-30
Payer: COMMERCIAL

## 2024-12-30 VITALS
HEART RATE: 81 BPM | BODY MASS INDEX: 33.33 KG/M2 | WEIGHT: 225 LBS | SYSTOLIC BLOOD PRESSURE: 122 MMHG | HEIGHT: 69 IN | DIASTOLIC BLOOD PRESSURE: 88 MMHG | OXYGEN SATURATION: 98 %

## 2024-12-30 DIAGNOSIS — K22.70 BARRETT'S ESOPHAGUS WITH ESOPHAGITIS: ICD-10-CM

## 2024-12-30 DIAGNOSIS — E78.2 MIXED HYPERLIPIDEMIA: ICD-10-CM

## 2024-12-30 DIAGNOSIS — R05.3 CHRONIC COUGH: ICD-10-CM

## 2024-12-30 DIAGNOSIS — N40.1 BPH ASSOCIATED WITH NOCTURIA: ICD-10-CM

## 2024-12-30 DIAGNOSIS — K20.90 BARRETT'S ESOPHAGUS WITH ESOPHAGITIS: ICD-10-CM

## 2024-12-30 DIAGNOSIS — R35.1 BPH ASSOCIATED WITH NOCTURIA: ICD-10-CM

## 2024-12-30 DIAGNOSIS — R73.01 IMPAIRED FASTING GLUCOSE: ICD-10-CM

## 2024-12-30 DIAGNOSIS — I10 ESSENTIAL HYPERTENSION: Primary | ICD-10-CM

## 2024-12-30 PROCEDURE — 99214 OFFICE O/P EST MOD 30 MIN: CPT | Performed by: FAMILY MEDICINE

## 2024-12-30 RX ORDER — LANSOPRAZOLE 30 MG/1
30 CAPSULE, DELAYED RELEASE ORAL 2 TIMES DAILY
Qty: 180 CAPSULE | Refills: 3 | Status: SHIPPED | OUTPATIENT
Start: 2024-12-30

## 2024-12-30 RX ORDER — PANTOPRAZOLE SODIUM 40 MG/1
40 TABLET, DELAYED RELEASE ORAL 2 TIMES DAILY
Qty: 180 TABLET | Refills: 1 | Status: SHIPPED | OUTPATIENT
Start: 2024-12-30 | End: 2024-12-30

## 2024-12-30 RX ORDER — TADALAFIL 5 MG/1
5 TABLET ORAL DAILY
Qty: 90 TABLET | Refills: 3 | Status: SHIPPED | OUTPATIENT
Start: 2024-12-30

## 2024-12-30 NOTE — PROGRESS NOTES
Subjective   Ariel Portillo is a 54 y.o. male. Presents today for   Chief Complaint   Patient presents with    Hyperlipidemia    Hypertension    Heartburn       New patient here to establish care    History of Present Illness  Patient 53 y/o male new to me but not [facility] system;   He has htn, has been controlled on meds;  Hashld, but working on diet;   He also has barretts noted on biopsy;  November 2023 had Bravo capsule placed noted reflux and referred for possible whipple procedure.  Has not been able to take off for work for this and so not on.  Was on dexilant and well controlled, but insurance stopped covering.   Ran out of pantoprazole and so taking OTC prevacid that works better than protonix.  Relates heartburn relatively controlled, but does have deep cough wonders if rflux induced;  He did have nromal PFT, tried a CARLOS A but no relief.  Tired as seemed to note more when outdoors and cold weather.   He has no abd pain; no cp/soa; no edema;  no bowel issues;   he has ED and would like try daily cialis.   He does have nocuria occly at night.      Review of Systems   Respiratory:  Positive for cough. Negative for shortness of breath and wheezing.    Cardiovascular:  Negative for chest pain, palpitations and leg swelling.   Gastrointestinal:  Negative for abdominal pain, blood in stool and nausea.   Genitourinary:  Positive for frequency.       Patient Active Problem List   Diagnosis    Cervical pain (neck)    Espinal's esophagus without dysplasia    Essential hypertension    Impaired fasting glucose    Encounter for screening for malignant neoplasm of colon    Heartburn    Allergic cough    Seasonal asthma    Erectile dysfunction    Hyperlipidemia    Gastroesophageal reflux disease    Cough     Past Medical History:   Diagnosis Date    Allergic     Espinal esophagus     Esophageal dilatation     GERD (gastroesophageal reflux disease)     Hypertension      Past Surgical History:   Procedure Laterality Date     BRAVO PROCEDURE N/A 11/3/2023    Procedure: ESOPHAGOGASTRODUODENOSCOPY AND BAKER;  Surgeon: Alfonso Kapoor MD;  Location:  [facility];  Service: Gastroenterology;  Laterality: N/A;  pre: cough  post: cough    COLONOSCOPY  2013    COLONOSCOPY N/A 01/17/2020    Procedure: COLONOSCOPY into cecum;  Surgeon: Isaac Vásquez MD;  Location:  [facility];  Service: Gastroenterology    ENDOSCOPY  2013    ENDOSCOPY N/A 01/17/2020    Procedure: ESOPHAGOGASTRODUODENOSCOPY with biopsies;  Surgeon: Isaac Vásquez MD;  Location: [facility];  Service: Gastroenterology    ENDOSCOPY AND COLONOSCOPY      TEETH EXTRACTION Right 2020    UPPER GASTROINTESTINAL ENDOSCOPY       Family History   Problem Relation Age of Onset    Heart disease Mother     Heart disease Father     Other Sister     Thyroid disease Sister     Colon cancer Paternal Grandmother     Malig Hyperthermia Neg Hx      Social History     Socioeconomic History    Marital status: Single   Tobacco Use    Smoking status: Never     Passive exposure: Never    Smokeless tobacco: Never   Vaping Use    Vaping status: Never Used   Substance and Sexual Activity    Alcohol use: Yes     Alcohol/week: 2.0 standard drinks of alcohol     Types: 2 Cans of beer per week     Comment: Occasional     Drug use: No    Sexual activity: Yes     Partners: Female     Birth control/protection: None       No Known Allergies    Current Outpatient Medications on File Prior to Visit   Medication Sig Dispense Refill    amLODIPine (NORVASC) 5 MG tablet TAKE 1 TABLET BY MOUTH EVERY DAY (Patient taking differently: Take 0.5 tablets by mouth Daily.) 90 tablet 3    hydroCHLOROthiazide 25 MG tablet TAKE 1 TABLET BY MOUTH EVERY DAY (Patient taking differently: Take 0.5 tablets by mouth Daily.) 90 tablet 0    mometasone (NASONEX) 50 MCG/ACT nasal spray 2 sprays daily 17 g 5    montelukast (SINGULAIR) 10 MG tablet TAKE 1 TABLET BY MOUTH EVERYDAY AT BEDTIME 90 tablet 1    valsartan (DIOVAN)  "320 MG tablet Take 1 tablet by mouth Daily. 90 tablet 1    [DISCONTINUED] albuterol sulfate  (90 Base) MCG/ACT inhaler Inhale 1-2 puffs Every 4 (Four) Hours As Needed for Wheezing or Shortness of Air. (Patient not taking: Reported on 12/30/2024) 18 g 1    [DISCONTINUED] diclofenac (VOLTAREN) 1 % gel gel Apply 4 g topically to the appropriate area as directed 4 (Four) Times a Day As Needed (pain). (Patient not taking: Reported on 12/30/2024)      [DISCONTINUED] levocetirizine (XYZAL) 5 MG tablet Take 1 tablet by mouth Daily. (Patient not taking: Reported on 12/30/2024) 90 tablet 3    [DISCONTINUED] pantoprazole (PROTONIX) 40 MG EC tablet TAKE 1 TABLET BY MOUTH TWICE A DAY (Patient not taking: Reported on 12/30/2024) 180 tablet 1    [DISCONTINUED] sildenafil (Viagra) 50 MG tablet Take 0.5 tablets by mouth Daily As Needed for Erectile Dysfunction. (Patient not taking: Reported on 12/30/2024) 30 tablet 1    [DISCONTINUED] Turmeric Curcumin 500 MG capsule Take 1,000 mg by mouth Daily. (Patient not taking: Reported on 12/30/2024)       No current facility-administered medications on file prior to visit.       Objective   Vitals:    12/30/24 1706 12/30/24 1740   BP: 122/94 122/88   Pulse: 81    SpO2: 98%    Weight: 102 kg (225 lb)    Height: 175.3 cm (69\")      Body mass index is 33.23 kg/m².  Physical Exam  Vitals and nursing note reviewed.   Constitutional:       Appearance: He is well-developed.   HENT:      Head: Normocephalic and atraumatic.   Neck:      Thyroid: No thyromegaly.      Vascular: No JVD.   Cardiovascular:      Rate and Rhythm: Normal rate and regular rhythm.      Heart sounds: Normal heart sounds. No murmur heard.     No friction rub. No gallop.   Pulmonary:      Effort: Pulmonary effort is normal. No respiratory distress.      Breath sounds: Normal breath sounds. No wheezing or rales.   Abdominal:      General: Bowel sounds are normal. There is no distension.      Palpations: Abdomen is soft.     "  Tenderness: There is no abdominal tenderness. There is no guarding or rebound.   Musculoskeletal:      Cervical back: Neck supple.   Skin:     General: Skin is warm and dry.   Neurological:      Mental Status: He is alert.      Gait: Gait normal.   Psychiatric:         Behavior: Behavior normal.         Assessment & Plan   Diagnoses and all orders for this visit:    1. Essential hypertension (Primary)  -     Comprehensive Metabolic Panel    2. Mixed hyperlipidemia  -     Comprehensive Metabolic Panel  -     Lipid Panel    3. Impaired fasting glucose  -     Hemoglobin A1c    4. Espinal's esophagus with esophagitis  -     lansoprazole (Prevacid) 30 MG capsule; Take 1 capsule by mouth 2 (Two) Times a Day.  Dispense: 180 capsule; Refill: 3    5. Chronic cough    6. BPH associated with nocturia  -     tadalafil (Cialis) 5 MG tablet; Take 1 tablet by mouth Daily.  Dispense: 90 tablet; Refill: 3  -     PSA DIAGNOSTIC    Other orders  -     Discontinue: pantoprazole (PROTONIX) 40 MG EC tablet; Take 1 tablet by mouth 2 (Two) Times a Day.  Dispense: 180 tablet; Refill: 1      -hypertension - controlled, continue medications  -hld - work on diet due recheck  -elevated bs in past, check A1c and fasting blood sugar  -chronic cough - ?gerd related, will try prevacid bid (taking once daily and better than protonix once daily) see if resolves.   Will need periodic egd given flor.  -ok try cialis, check psa;  I gave goodrx coupon to Rafael in case not covered         BMI is >= 30 and <35. (Class 1 Obesity). The following options were offered after discussion;: weight loss educational material (shared in after visit summary)     -Follow up: 6 months and prn

## 2025-05-01 DIAGNOSIS — I10 ESSENTIAL HYPERTENSION: ICD-10-CM

## 2025-05-01 RX ORDER — VALSARTAN 320 MG/1
320 TABLET ORAL DAILY
Qty: 90 TABLET | Refills: 1 | Status: SHIPPED | OUTPATIENT
Start: 2025-05-01

## 2025-06-23 ENCOUNTER — OFFICE VISIT (OUTPATIENT)
Dept: FAMILY MEDICINE CLINIC | Facility: CLINIC | Age: 55
End: 2025-06-23
Payer: COMMERCIAL

## 2025-06-23 VITALS
SYSTOLIC BLOOD PRESSURE: 150 MMHG | OXYGEN SATURATION: 98 % | DIASTOLIC BLOOD PRESSURE: 110 MMHG | BODY MASS INDEX: 33.33 KG/M2 | HEART RATE: 78 BPM | HEIGHT: 69 IN | WEIGHT: 225 LBS

## 2025-06-23 DIAGNOSIS — R05.8 ALLERGIC COUGH: ICD-10-CM

## 2025-06-23 DIAGNOSIS — Z00.00 WELLNESS EXAMINATION: Primary | ICD-10-CM

## 2025-06-23 DIAGNOSIS — J45.998 SEASONAL ASTHMA: ICD-10-CM

## 2025-06-23 DIAGNOSIS — R35.1 BPH ASSOCIATED WITH NOCTURIA: ICD-10-CM

## 2025-06-23 DIAGNOSIS — I10 ESSENTIAL HYPERTENSION: ICD-10-CM

## 2025-06-23 DIAGNOSIS — E78.5 DYSLIPIDEMIA: ICD-10-CM

## 2025-06-23 DIAGNOSIS — N40.1 BPH ASSOCIATED WITH NOCTURIA: ICD-10-CM

## 2025-06-23 DIAGNOSIS — Z79.899 DRUG THERAPY: ICD-10-CM

## 2025-06-23 DIAGNOSIS — R73.01 IFG (IMPAIRED FASTING GLUCOSE): ICD-10-CM

## 2025-06-23 PROCEDURE — 99396 PREV VISIT EST AGE 40-64: CPT | Performed by: FAMILY MEDICINE

## 2025-06-23 RX ORDER — HYDROCHLOROTHIAZIDE 25 MG/1
25 TABLET ORAL DAILY
Qty: 90 TABLET | Refills: 3 | Status: SHIPPED | OUTPATIENT
Start: 2025-06-23

## 2025-06-23 RX ORDER — MOMETASONE FUROATE MONOHYDRATE 50 UG/1
SPRAY, METERED NASAL
Qty: 51 G | Refills: 3 | Status: SHIPPED | OUTPATIENT
Start: 2025-06-23

## 2025-06-23 RX ORDER — VALSARTAN 320 MG/1
320 TABLET ORAL DAILY
Qty: 90 TABLET | Refills: 3 | Status: SHIPPED | OUTPATIENT
Start: 2025-06-23

## 2025-06-23 RX ORDER — AMLODIPINE BESYLATE 5 MG/1
2.5 TABLET ORAL DAILY
Qty: 45 TABLET | Refills: 3 | Status: SHIPPED | OUTPATIENT
Start: 2025-06-23

## 2025-06-23 RX ORDER — TADALAFIL 5 MG/1
5 TABLET ORAL DAILY
Qty: 180 TABLET | Refills: 1 | Status: SHIPPED | OUTPATIENT
Start: 2025-06-23

## 2025-06-23 NOTE — PROGRESS NOTES
Subjective   Ariel Portillo is a 54 y.o. male. Presents today for   Chief Complaint   Patient presents with    Annual Exam    Hypertension         History of Present Illness  History of Present Illness  The patient is a 54-year-old male here for an annual wellness visit. He has a history of hypertension and was last seen in 12/2024 as a new patient.    He reports no new health issues since his last visit. He has been managing well with his antihypertensive medication but recently exhausted his supply and has been without it for several weeks. He is seeking refills for all his medications, including Flonase and Cialis. He is uncertain about the need for a Viagra refill, as he has 4 to 5 tablets remaining. He takes Cialis daily and finds it effective. He is not experiencing any chest pain or shortness of breath. He has had a few headaches, which he attributes to sinus issues rather than hypertension. 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 He is considering increasing his Flonase dosage to twice daily during the summer months. He consumed a cup of coffee with cream this morning but has not eaten anything.  Review of Systems   Respiratory:  Negative for shortness of breath.    Cardiovascular:  Negative for chest pain and palpitations.   Allergic/Immunologic: Positive for environmental allergies.   Neurological:  Positive for headaches.       Patient Active Problem List   Diagnosis    Cervical pain (neck)    Espinal's esophagus without dysplasia    Essential hypertension    Impaired fasting glucose    Encounter for screening for malignant neoplasm of colon    Heartburn    Allergic cough    Seasonal asthma    Erectile dysfunction    Hyperlipidemia    Gastroesophageal reflux disease    Cough       Social History     Socioeconomic History    Marital status: Single   Tobacco Use    Smoking status: Never     Passive  "exposure: Never    Smokeless tobacco: Never   Vaping Use    Vaping status: Never Used   Substance and Sexual Activity    Alcohol use: Yes     Alcohol/week: 2.0 standard drinks of alcohol     Types: 2 Cans of beer per week     Comment: Occasional     Drug use: No    Sexual activity: Yes     Partners: Female     Birth control/protection: None       No Known Allergies      Objective   Vitals:    06/23/25 1111   BP: (!) 150/110   Pulse: 78   SpO2: 98%   Weight: 102 kg (225 lb)   Height: 175.3 cm (69\")     Body mass index is 33.23 kg/m².    Physical Exam  Vitals and nursing note reviewed.   Constitutional:       Appearance: He is well-developed.   HENT:      Head: Normocephalic and atraumatic.   Neck:      Thyroid: No thyromegaly.      Vascular: No JVD.   Cardiovascular:      Rate and Rhythm: Normal rate and regular rhythm.      Heart sounds: Normal heart sounds. No murmur heard.     No friction rub. No gallop.   Pulmonary:      Effort: Pulmonary effort is normal. No respiratory distress.      Breath sounds: Normal breath sounds. No wheezing or rales.   Abdominal:      General: Bowel sounds are normal. There is no distension.      Palpations: Abdomen is soft.      Tenderness: There is no abdominal tenderness. There is no guarding or rebound.   Musculoskeletal:      Cervical back: Neck supple.   Skin:     General: Skin is warm and dry.   Neurological:      Mental Status: He is alert.      Gait: Gait normal.   Psychiatric:         Behavior: Behavior normal.       Physical Exam  Cardiovascular: Regular rate and rhythm, no murmurs, rubs, or gallops    Results       Assessment & Plan   Diagnoses and all orders for this visit:    1. Wellness examination (Primary)    2. Essential hypertension  -     amLODIPine (NORVASC) 5 MG tablet; Take 0.5 tablets by mouth Daily.  Dispense: 45 tablet; Refill: 3  -     valsartan (DIOVAN) 320 MG tablet; Take 1 tablet by mouth Daily.  Dispense: 90 tablet; Refill: 3    3. Seasonal asthma  -     " mometasone (NASONEX) 50 MCG/ACT nasal spray; 2 sprays daily  Dispense: 51 g; Refill: 3    4. Allergic cough  -     mometasone (NASONEX) 50 MCG/ACT nasal spray; 2 sprays daily  Dispense: 51 g; Refill: 3    5. BPH associated with nocturia  -     tadalafil (Cialis) 5 MG tablet; Take 1 tablet by mouth Daily. And 1to 2 extra daily prn ED  Dispense: 180 tablet; Refill: 1  -     PSA DIAGNOSTIC    6. Dyslipidemia  -     Comprehensive Metabolic Panel  -     Lipid Panel    7. Drug therapy  -     CBC (No Diff)    8. IFG (impaired fasting glucose)  -     Hemoglobin A1c    Other orders  -     hydroCHLOROthiazide 25 MG tablet; Take 1 tablet by mouth Daily.  Dispense: 90 tablet; Refill: 3      Couneled on diet and exercise  Take bp meds as soon as get, out  2 weeks     Assessment & Plan  1. Hypertension.  - Blood pressure is elevated, likely due to running out of medication a couple of weeks ago.  - Blood pressure medication effectiveness is compromised due to non-adherence.  - Discussed the importance of medication adherence and potential symptoms of uncontrolled hypertension.  - A 90-day supply of antihypertensive medication will be refilled and sent to pharmacy.    2. Sinus headaches.  - Experiences sinus headaches, particularly when working in hot and isabella conditions.  - Regular Flonase is preferred over the blue variant due to better symptom control.  - Discussed the plan to use Flonase twice a day during the summer for symptom management.  - A 90-day supply of regular Flonase will be refilled and sent to pharmacy.    3. Erectile dysfunction.  - Inquired about taking Viagra with Cialis; advised not to take simultaneously due to similar mechanism of action.  - Cialis is taken daily; discussed spacing Viagra by about 48 hours if needed.  - Cialis is reported to be effective; discussed increasing the quantity.  - A prescription for Cialis will be provided with an increased quantity and sent to pharmacy.    4. Health  maintenance.  - Comprehensive blood work will be ordered, including kidney function, liver function, electrolytes, glucose levels, prostate cancer screening, and complete blood count.  - Heart sounds are normal; no chest pain or shortness of breath reported.  - Advised to take vitamin B12 supplements at a dosage of 1000 mcg daily to address fatigue; further evaluation if no improvement.  - Follow-up scheduled in 6 months, with potential annual visits if stable.          -Follow up: 6 months and annual if stable    ________________________________________  Tim Blackmon DO, MS    Current Outpatient Medications on File Prior to Visit   Medication Sig Dispense Refill    lansoprazole (Prevacid) 30 MG capsule Take 1 capsule by mouth 2 (Two) Times a Day. 180 capsule 3    montelukast (SINGULAIR) 10 MG tablet TAKE 1 TABLET BY MOUTH EVERYDAY AT BEDTIME 90 tablet 1    [DISCONTINUED] amLODIPine (NORVASC) 5 MG tablet TAKE 1 TABLET BY MOUTH EVERY DAY (Patient taking differently: Take 0.5 tablets by mouth Daily.) 90 tablet 3    [DISCONTINUED] hydroCHLOROthiazide 25 MG tablet TAKE 1 TABLET BY MOUTH EVERY DAY (Patient taking differently: Take 0.5 tablets by mouth Daily.) 90 tablet 0    [DISCONTINUED] mometasone (NASONEX) 50 MCG/ACT nasal spray 2 sprays daily 17 g 5    [DISCONTINUED] tadalafil (Cialis) 5 MG tablet Take 1 tablet by mouth Daily. 90 tablet 3    [DISCONTINUED] valsartan (DIOVAN) 320 MG tablet TAKE 1 TABLET BY MOUTH EVERY DAY (Patient not taking: Reported on 6/23/2025) 90 tablet 1     No current facility-administered medications on file prior to visit.

## 2025-06-24 LAB
ALBUMIN SERPL ELPH-MCNC: 4.2 G/DL
ALP SERPL-CCNC: 57 U/L (ref 39–117)
ALT SERPL W P-5'-P-CCNC: 20 U/L (ref 1–41)
AST SERPL-CCNC: 21 U/L (ref 1–40)
BILIRUB SERPL-MCNC: 0.6 MG/DL (ref 0–1.2)
BUN SERPL-MCNC: 15 MG/DL (ref 6–20)
CHLORIDE SERPL-SCNC: 105 MMOL/L (ref 98–107)
CHOLEST SERPL-MCNC: 200 MG/DL
CO2 SERPL-SCNC: 27 MMOL/L
COM CRY STONE QL IR: 9.3
CREATININE: 1.1
EGFR GENE MUT ANL BLD/T: 79
ERYTHROCYTE [DISTWIDTH] IN BLOOD BY AUTOMATED COUNT: 14.4 %
EXTERNAL PLATELET COUNT: 211 K/ΜL
GLOBULIN UR ELPH-MCNC: 2.7 GM/DL
GLUCOSE BLD-MCNC: 98 MG/DL
HBA1C MFR BLD: 5.2 % (ref 4.8–5.9)
HDLC SERPL QL: 3.6
HDLC SERPL-MCNC: 56 MG/DL (ref 40–60)
HGB BLD-MCNC: 16.3 G/DL (ref 13–17.7)
INSULIN SERPL-ACNC: 1.6 CAL
LDL CHOL. (DIRECT): 125 MG/DL
MCH RBC QN AUTO: 29.6 PG
MCHC RBC AUTO-ENTMCNC: 32.1 G/DL
MCV RBC AUTO: 92 FL
NON HDL CHOLESTEROL: 144 MG/DL
PMV BLD AUTO: 10 FL (ref 6–12)
POTASSIUM BLD-SCNC: 4.1 MMOL/L
PROT SERPL-MCNC: 6.9 G/DL (ref 6–8.5)
PSA SERPL-MCNC: 1.43 NG/ML (ref 0–4)
RBC # BLD AUTO: 5.51 10*6/UL
SODIUM BLD-SCNC: 141 MMOL/L
SR HEMATOCRIT: 50.7
TRIGL SERPL-MCNC: 86 MG/DL (ref 0–150)
WBC SPT QL MICRO: 4.9

## 2025-06-30 ENCOUNTER — RESULTS FOLLOW-UP (OUTPATIENT)
Dept: FAMILY MEDICINE CLINIC | Facility: CLINIC | Age: 55
End: 2025-06-30

## 2025-06-30 NOTE — PROGRESS NOTES
Mail copy of results to patient.  Blood counts normal  A1C normal  PSA normal  Kidney and liver function normal  Cholesterol high - work on diet and exercise to improve.

## 2025-06-30 NOTE — LETTER
"Ariel Portillo  [id number] [id number]    June 30, 2025     Dear Mr. Portillo:    Below are the results from your recent visit:    Blood counts normal  A1C normal  PSA normal  Kidney and liver function normal  Cholesterol high - work on diet and exercise to improve.      What is cholesterol? -- Cholesterol is a substance that is found in the blood. Everyone has some. It is needed for good health. The problem is, people sometimes have too much cholesterol. Compared with people with normal cholesterol, people with high cholesterol have a higher risk of heart attacks, strokes, and other health problems. The higher your cholesterol, the higher your risk of these problems.  Are there different types of cholesterol? -- Yes, there are a few different types. If you get a cholesterol test, you might hear your doctor or nurse talk about:  ?Total cholesterol  ?LDL cholesterol - Some people call this the \"bad\" cholesterol. That's because having high LDL levels raises your risk of heart attacks, strokes, and other health problems.  ?HDL cholesterol - Some people call this the \"good\" cholesterol. That's because people with high HDL levels tend to have a lower risk of heart attacks, strokes, and other health problems.   ?Non-HDL cholesterol - Non-HDL cholesterol is your total cholesterol minus your HDL cholesterol.  ?Triglycerides - Triglycerides are not cholesterol. They are another type of fat. But they often get measured when cholesterol is measured. (Having high triglycerides also seems to increase the risk of heart attacks and strokes.)  What should my numbers be? -- Ask your doctor or nurse what your numbers should be. Different people need different goals. (If you live outside the [place], see the table (table 1)). In general, people who do not already have heart disease should aim for:  ?Total cholesterol below 200  ?LDL cholesterol below 130 - or much lower, if they are at risk of heart attacks or " "strokes  ?HDL cholesterol above 40 for Men and above 50 for women  ?Non-HDL cholesterol below 160 - or lower, if they are at risk of heart attacks or strokes  ?Triglycerides below 150  Keep in mind, though, that many people who cannot meet these goals still have a low risk of heart attacks and strokes.  What should I do if my doctor tells me I have high cholesterol? -- Ask your doctor what your overall risk of heart attacks and strokes is. High cholesterol, by itself, is not always a reason to worry. Having high cholesterol is just one of many things that can increase your risk of heart attacks and strokes. Other factors that increase your risk include:  ?Cigarette smoking  ?High blood pressure  ?Having a parent, sister, or brother who got heart disease at a young age - Young, in this case, means younger than 55 for men and younger than 65 for women.  ?A diet that is not heart healthy - A \"heart-healthy\" diet includes lots of fruits and vegetables, fiber, and healthy fats (like those found in fish and certain oils). It also means limiting sugar and unhealthy fats.  ?Older age  If you are at high risk of heart attacks and strokes, having high cholesterol is a problem. On the other hand, if you are at low risk, having high cholesterol might not lead to treatment.  Should I take medicine to lower cholesterol? -- Not everyone who has high cholesterol needs medicines. Your doctor or nurse will decide if you need them based on your age, family history, and other health concerns.  You should probably take a cholesterol-lowering medicine called a statin if you:  ?Already had a heart attack or stroke  ?Have known heart disease  ?Have diabetes  ?Have a condition called peripheral artery disease, which makes it painful to walk, and happens when the arteries in your legs get clogged with fatty deposits  ?Have an abdominal aortic aneurysm, which is a widening of the main artery in the belly  Most people with any of the " conditions listed above should take a statin no matter what their cholesterol level is. If your doctor or nurse puts you on a statin, stay on it. The medicine might not make you feel any different. But it can help prevent heart attacks, strokes, and death.  Can I lower my cholesterol without medicines? -- Yes, you can lower your cholesterol some by:  ?Avoiding red meat, butter, fried foods, cheese, and other foods that have a lot of saturated fat  ?Losing weight (if you are overweight)  ?Being more active  Even if these steps do little to change your cholesterol, they can improve your health in many ways.     Resulted Orders   CBC (No Diff)   Result Value Ref Range    WBC - CDIFF 4.9     RBC Count 5.51     Hemoglobin 16.3 13.0 - 17.7 g/dL    Hematocrit, Blood Gas 50.7     External MCV 92.0     External MCH 29.6     External MCHC 32.1     External RDW 14.4     External Platelets 211 K/µL    MPV 10.0 6.0 - 12.0 fL   Comprehensive Metabolic Panel   Result Value Ref Range    Glucose 98     BUN 15.0 6.0 - 20.0 mg/dL    CREATININE 1.10     EGFR Result 79     Sodium 141 mmol/L    Potassium 4.1 mmol/L    Chloride 105 98 - 107 mmol/L    Total CO2 27 mmol/L    Calcium Oxalate 9.3     Total Protein 6.9 6.0 - 8.5 g/dL    Albumin 4.2     Globulin 2.7 gm/dL    Albumin/Globulin Ratio 1.6 josh    Total Bilirubin 0.6 0.0 - 1.2 mg/dL    Alkaline Phosphatase 57 39 - 117 U/L    AST (SGOT) 21 1 - 40 U/L    ALT (SGPT) 20 1 - 41 U/L   Lipid Panel   Result Value Ref Range    Total Cholesterol 200 mg/dL    HDL Cholesterol 56 40 - 60 mg/dL    Triglycerides 86 0 - 150 mg/dL    LDL Cholesterol  125 mg/dL    Chol/HDL Ratio 3.60     Non HDL Cholesterol (LDL+VLDL) 144 mg/dL   Hemoglobin A1c   Result Value Ref Range    Hemoglobin A1C 5.2 4.8 - 5.9 %   PSA DIAGNOSTIC   Result Value Ref Range    PSA 1.430 0.000 - 4.000 ng/mL     If you have any questions or concerns, please don't hesitate to call.    Sincerely,        Tim Blackmon, DO

## (undated) DEVICE — ADAPT CLN BIOGUARD AIR/H2O DISP

## (undated) DEVICE — LN SMPL CO2 SHTRM SD STREAM W/M LUER

## (undated) DEVICE — SENSR O2 OXIMAX FNGR A/ 18IN NONSTR

## (undated) DEVICE — BITEBLOCK OMNI BLOC

## (undated) DEVICE — KT ORCA ORCAPOD DISP STRL

## (undated) DEVICE — Device: Brand: DEFENDO BIOPSY VALVE IRRIGATOR

## (undated) DEVICE — CANN O2 ETCO2 FITS ALL CONN CO2 SMPL A/ 7IN DISP LF

## (undated) DEVICE — THE TORRENT IRRIGATION SCOPE CONNECTOR IS USED WITH THE TORRENT IRRIGATION TUBING TO PROVIDE IRRIGATION FLUIDS SUCH AS STERILE WATER DURING GASTROINTESTINAL ENDOSCOPIC PROCEDURES WHEN USED IN CONJUNCTION WITH AN IRRIGATION PUMP (OR ELECTROSURGICAL UNIT).: Brand: TORRENT

## (undated) DEVICE — SINGLE-USE BIOPSY FORCEPS: Brand: RADIAL JAW 4

## (undated) DEVICE — TUBING, SUCTION, 1/4" X 10', STRAIGHT: Brand: MEDLINE

## (undated) DEVICE — BLCK/BITE BLOX W/DENTL/RIM W/STRAP 54F